# Patient Record
Sex: FEMALE | Employment: PART TIME | URBAN - METROPOLITAN AREA
[De-identification: names, ages, dates, MRNs, and addresses within clinical notes are randomized per-mention and may not be internally consistent; named-entity substitution may affect disease eponyms.]

---

## 2020-12-01 ENCOUNTER — OFFICE VISIT (OUTPATIENT)
Dept: FAMILY MEDICINE | Facility: CLINIC | Age: 25
End: 2020-12-01
Payer: COMMERCIAL

## 2020-12-01 VITALS
OXYGEN SATURATION: 98 % | HEART RATE: 84 BPM | SYSTOLIC BLOOD PRESSURE: 108 MMHG | WEIGHT: 125.6 LBS | HEIGHT: 62 IN | TEMPERATURE: 97.8 F | DIASTOLIC BLOOD PRESSURE: 74 MMHG | BODY MASS INDEX: 23.11 KG/M2 | RESPIRATION RATE: 16 BRPM

## 2020-12-01 DIAGNOSIS — R39.15 URINARY URGENCY: ICD-10-CM

## 2020-12-01 DIAGNOSIS — M62.81 GENERALIZED MUSCLE WEAKNESS: Primary | ICD-10-CM

## 2020-12-01 DIAGNOSIS — F45.8 FUNCTIONAL DYSPNEA: ICD-10-CM

## 2020-12-01 DIAGNOSIS — F41.8 MIXED ANXIETY AND DEPRESSIVE DISORDER: ICD-10-CM

## 2020-12-01 DIAGNOSIS — Z13.1 DIABETES MELLITUS SCREENING: ICD-10-CM

## 2020-12-01 LAB
ALBUMIN SERPL-MCNC: 4 G/DL (ref 3.4–5)
ALBUMIN UR-MCNC: NEGATIVE MG/DL
ALP SERPL-CCNC: 42 U/L (ref 40–150)
ALT SERPL W P-5'-P-CCNC: 21 U/L (ref 0–50)
ANION GAP SERPL CALCULATED.3IONS-SCNC: 2 MMOL/L (ref 3–14)
APPEARANCE UR: CLEAR
AST SERPL W P-5'-P-CCNC: 17 U/L (ref 0–45)
BILIRUB SERPL-MCNC: 0.2 MG/DL (ref 0.2–1.3)
BILIRUB UR QL STRIP: NEGATIVE
BUN SERPL-MCNC: 10 MG/DL (ref 7–30)
CALCIUM SERPL-MCNC: 9.2 MG/DL (ref 8.5–10.1)
CHLORIDE SERPL-SCNC: 110 MMOL/L (ref 94–109)
CO2 SERPL-SCNC: 30 MMOL/L (ref 20–32)
COLOR UR AUTO: YELLOW
CREAT SERPL-MCNC: 0.54 MG/DL (ref 0.52–1.04)
ERYTHROCYTE [DISTWIDTH] IN BLOOD BY AUTOMATED COUNT: 11.8 % (ref 10–15)
GFR SERPL CREATININE-BSD FRML MDRD: >90 ML/MIN/{1.73_M2}
GLUCOSE SERPL-MCNC: 91 MG/DL (ref 70–99)
GLUCOSE UR STRIP-MCNC: NEGATIVE MG/DL
HBA1C MFR BLD: 5.1 % (ref 0–5.6)
HCT VFR BLD AUTO: 41 % (ref 35–47)
HGB BLD-MCNC: 14 G/DL (ref 11.7–15.7)
HGB UR QL STRIP: NEGATIVE
KETONES UR STRIP-MCNC: NEGATIVE MG/DL
LEUKOCYTE ESTERASE UR QL STRIP: NEGATIVE
MCH RBC QN AUTO: 33.7 PG (ref 26.5–33)
MCHC RBC AUTO-ENTMCNC: 34.1 G/DL (ref 31.5–36.5)
MCV RBC AUTO: 99 FL (ref 78–100)
NITRATE UR QL: NEGATIVE
PH UR STRIP: 5.5 PH (ref 5–7)
PLATELET # BLD AUTO: 298 10E9/L (ref 150–450)
POTASSIUM SERPL-SCNC: 3.6 MMOL/L (ref 3.4–5.3)
PROT SERPL-MCNC: 7.5 G/DL (ref 6.8–8.8)
RBC # BLD AUTO: 4.16 10E12/L (ref 3.8–5.2)
SODIUM SERPL-SCNC: 142 MMOL/L (ref 133–144)
SOURCE: NORMAL
SP GR UR STRIP: 1.02 (ref 1–1.03)
TSH SERPL DL<=0.005 MIU/L-ACNC: 1.16 MU/L (ref 0.4–4)
UROBILINOGEN UR STRIP-ACNC: 0.2 EU/DL (ref 0.2–1)
VIT B12 SERPL-MCNC: 239 PG/ML (ref 193–986)
WBC # BLD AUTO: 5.8 10E9/L (ref 4–11)

## 2020-12-01 PROCEDURE — 36415 COLL VENOUS BLD VENIPUNCTURE: CPT | Performed by: FAMILY MEDICINE

## 2020-12-01 PROCEDURE — 81003 URINALYSIS AUTO W/O SCOPE: CPT | Performed by: FAMILY MEDICINE

## 2020-12-01 PROCEDURE — 99204 OFFICE O/P NEW MOD 45 MIN: CPT | Performed by: FAMILY MEDICINE

## 2020-12-01 PROCEDURE — 84443 ASSAY THYROID STIM HORMONE: CPT | Performed by: FAMILY MEDICINE

## 2020-12-01 PROCEDURE — 80053 COMPREHEN METABOLIC PANEL: CPT | Performed by: FAMILY MEDICINE

## 2020-12-01 PROCEDURE — 82607 VITAMIN B-12: CPT | Performed by: FAMILY MEDICINE

## 2020-12-01 PROCEDURE — 82306 VITAMIN D 25 HYDROXY: CPT | Performed by: FAMILY MEDICINE

## 2020-12-01 PROCEDURE — 85027 COMPLETE CBC AUTOMATED: CPT | Performed by: FAMILY MEDICINE

## 2020-12-01 PROCEDURE — 83036 HEMOGLOBIN GLYCOSYLATED A1C: CPT | Performed by: FAMILY MEDICINE

## 2020-12-01 SDOH — HEALTH STABILITY: MENTAL HEALTH: HOW OFTEN DO YOU HAVE SIX OR MORE DRINKS ON ONE OCCASION?: NEVER

## 2020-12-01 SDOH — HEALTH STABILITY: MENTAL HEALTH: HOW MANY STANDARD DRINKS CONTAINING ALCOHOL DO YOU HAVE ON A TYPICAL DAY?: NOT ASKED

## 2020-12-01 SDOH — HEALTH STABILITY: MENTAL HEALTH: HOW OFTEN DO YOU HAVE A DRINK CONTAINING ALCOHOL?: NEVER

## 2020-12-01 SDOH — HEALTH STABILITY: MENTAL HEALTH: HOW MANY DRINKS CONTAINING ALCOHOL DO YOU HAVE ON A TYPICAL DAY WHEN YOU ARE DRINKING?: NOT ASKED

## 2020-12-01 SDOH — HEALTH STABILITY: MENTAL HEALTH: HOW OFTEN DO YOU HAVE 6 OR MORE DRINKS ON ONE OCCASION?: NEVER

## 2020-12-01 ASSESSMENT — PATIENT HEALTH QUESTIONNAIRE - PHQ9
5. POOR APPETITE OR OVEREATING: MORE THAN HALF THE DAYS
SUM OF ALL RESPONSES TO PHQ QUESTIONS 1-9: 11

## 2020-12-01 ASSESSMENT — MIFFLIN-ST. JEOR: SCORE: 1257.97

## 2020-12-01 ASSESSMENT — ANXIETY QUESTIONNAIRES
3. WORRYING TOO MUCH ABOUT DIFFERENT THINGS: NEARLY EVERY DAY
2. NOT BEING ABLE TO STOP OR CONTROL WORRYING: NEARLY EVERY DAY
5. BEING SO RESTLESS THAT IT IS HARD TO SIT STILL: SEVERAL DAYS
1. FEELING NERVOUS, ANXIOUS, OR ON EDGE: SEVERAL DAYS
GAD7 TOTAL SCORE: 13
7. FEELING AFRAID AS IF SOMETHING AWFUL MIGHT HAPPEN: NOT AT ALL
IF YOU CHECKED OFF ANY PROBLEMS ON THIS QUESTIONNAIRE, HOW DIFFICULT HAVE THESE PROBLEMS MADE IT FOR YOU TO DO YOUR WORK, TAKE CARE OF THINGS AT HOME, OR GET ALONG WITH OTHER PEOPLE: SOMEWHAT DIFFICULT
6. BECOMING EASILY ANNOYED OR IRRITABLE: NEARLY EVERY DAY

## 2020-12-01 NOTE — PROGRESS NOTES
Subjective     Tierra Flowers is a 25 year old female who presents to clinic today for the following health issues:    HPI            New Patient to [facility].    New Patient/Transfer of Care. Moved to [place] from [place] in 2018- has not been seen in health care since move.   Previous PCP; [facility] in [place]- RIMMA signed.     Multiple concerns.     Generalized weakness x 3-4 years.   Reports feeling weakness in her bones or joints all over body.  Unable to hold objects for more than a few minutes.   Will struggle at times getting up from a sitting position.  Hears cracking in knees with walking. Denies having knee pain.     Reporting overall dryness of skin, wanting to be screened for diabetes.   Maternal uncle has diabetes.     Reports having urinary urgency. No dysuria, blood in urine or vaginal symptoms    Intermittent dyspnea x 9 years   Reports occasional sighing with dyspnea chest tightness/ heaviness with stressful situations- feels as it she can not take a full breath and will also notice if she has too much caffeine.  Will have a pain in middle of chest when she is struggling with her breathing.  No  Previous dx of asthma or anxiety.   States that previous work up was unremarkable.     States that she's had some mixed symptoms of depression with anxiety since the COVID-19 pandemic and having to work from home.    Last PHQ-9 12/1/2020   1.  Little interest or pleasure in doing things 2   2.  Feeling down, depressed, or hopeless 1   3.  Trouble falling or staying asleep, or sleeping too much 1   4.  Feeling tired or having little energy 2   5.  Poor appetite or overeating 1   6.  Feeling bad about yourself 1   7.  Trouble concentrating 2   8.  Moving slowly or restless 1   Q9: Thoughts of better off dead/self-harm past 2 weeks 0   PHQ-9 Total Score 11     FRANCINE-7  12/1/2020   1. Feeling nervous, anxious, or on edge 1   2. Not being able to stop or control worrying 3   3. Worrying too much about different things  "3   4. Trouble relaxing 2   5. Being so restless that it is hard to sit still 1   6. Becoming easily annoyed or irritable 3   7. Feeling afraid, as if something awful might happen 0   FRANCINE-7 Total Score 13   If you checked any problems, how difficult have they made it for you to do your work, take care of things at home, or get along with other people? Somewhat difficult     In the past two weeks have you had thoughts of suicide or self-harm?  No.    Do you have concerns about your personal safety or the safety of others?   No            HEALTH CARE MAINTENANCE: Due for a Physical with a Pap and screening labs.    Has never had pap smear completed before, choses not to have done until after she has been sexually active due to religous reasons.         Review of Systems   Constitutional, HEENT, cardiovascular, pulmonary, GI, , musculoskeletal, neuro, skin, endocrine and psych systems are negative, except as otherwise noted.        Objective    /74   Pulse 84   Temp 97.8  F (36.6  C) (Tympanic)   Resp 16   Ht 1.575 m (5' 2\")   Wt 57 kg (125 lb 9.6 oz)   LMP 11/19/2020   SpO2 98%   Breastfeeding No   BMI 22.97 kg/m    Body mass index is 22.97 kg/m .  Physical Exam   GENERAL: healthy, alert and no distress  EYES: Eyes grossly normal to inspection, PERRL and conjunctivae and sclerae normal  HENT: ear canals and TM's normal, nose and mouth without ulcers or lesions  NECK: no adenopathy, no asymmetry, masses, or scars and thyroid normal to palpation  RESP: lungs clear to auscultation - no rales, rhonchi or wheezes  CV: regular rate and rhythm, normal S1 S2, no S3 or S4, no murmur, click or rub, no peripheral edema and peripheral pulses strong  PSYCH: mentation appears normal, anxious, judgement and insight intact and appearance well groomed    DATA  Reviewed and discussed with patient prior to discharge.  Results for orders placed or performed in visit on 12/01/20   Hemoglobin A1c     Status: None   Result " Value Ref Range    Hemoglobin A1C 5.1 0 - 5.6 %   UA reflex to Microscopic and Culture     Status: None    Specimen: Midstream Urine   Result Value Ref Range    Color Urine Yellow     Appearance Urine Clear     Glucose Urine Negative NEG^Negative mg/dL    Bilirubin Urine Negative NEG^Negative    Ketones Urine Negative NEG^Negative mg/dL    Specific Gravity Urine 1.020 1.003 - 1.035    Blood Urine Negative NEG^Negative    pH Urine 5.5 5.0 - 7.0 pH    Protein Albumin Urine Negative NEG^Negative mg/dL    Urobilinogen Urine 0.2 0.2 - 1.0 EU/dL    Nitrite Urine Negative NEG^Negative    Leukocyte Esterase Urine Negative NEG^Negative    Source Midstream Urine    CBC with platelets     Status: Abnormal   Result Value Ref Range    WBC 5.8 4.0 - 11.0 10e9/L    RBC Count 4.16 3.8 - 5.2 10e12/L    Hemoglobin 14.0 11.7 - 15.7 g/dL    Hematocrit 41.0 35.0 - 47.0 %    MCV 99 78 - 100 fl    MCH 33.7 (H) 26.5 - 33.0 pg    MCHC 34.1 31.5 - 36.5 g/dL    RDW 11.8 10.0 - 15.0 %    Platelet Count 298 150 - 450 10e9/L             Assessment & Plan     Tierra was seen today for fatigue, breathing problem and uti.    Diagnoses and all orders for this visit:    Generalized muscle weakness  -     CBC with platelets  -     Comprehensive metabolic panel  -     TSH with free T4 reflex  -     Vitamin B12  -     Vitamin D Deficiency  -     PHQ-9/FRANCINE 7 completed, see above/Epic for details      Diabetes mellitus screening  -     Hemoglobin A1c    Urinary urgency  -     UA reflex to Microscopic and Culture    Mixed anxiety and depressive disorder       -     PHQ-9/FRANCINE 7 completed, see above/Epic for details        -     Await labs above, if normal consider starting an selective serotonin reuptake inhibitor and referral for Psychotherapy.    Functional dyspnea, suspect related to anxiety       -    Will await previous records regarding workup done  and current labs.       -     Discuss anxiety treatment options at the next office visit, if labs are  normal.           Depression Screening Follow Up    PHQ 12/1/2020   PHQ-9 Total Score 11   Q9: Thoughts of better off dead/self-harm past 2 weeks Not at all     Last PHQ-9 12/1/2020   1.  Little interest or pleasure in doing things 2   2.  Feeling down, depressed, or hopeless 1   3.  Trouble falling or staying asleep, or sleeping too much 1   4.  Feeling tired or having little energy 2   5.  Poor appetite or overeating 1   6.  Feeling bad about yourself 1   7.  Trouble concentrating 2   8.  Moving slowly or restless 1   Q9: Thoughts of better off dead/self-harm past 2 weeks 0   PHQ-9 Total Score 11       Follow Up Actions Taken  Follow up recommended: 1 month         Return in about 1 month (around 1/1/2021), or if symptoms worsen or fail to improve, for a Physical Exam at your earliest convenience..    Marisa Garcia MD  [facility]

## 2020-12-01 NOTE — PATIENT INSTRUCTIONS
Will notify you with results and next steps.      Patient Education     Anxiety Reaction  Anxiety is the feeling we all get when we think something bad might happen. It is a normal response to stress and usually causes only a mild reaction. When anxiety becomes more severe, it can interfere with daily life. In some cases, you may not even be aware of what it is you re anxious about. There may also be a genetic link or it may be a learned behavior in the home.  Both psychological and physical triggers cause stress reaction. It's often a response to fear or emotional stress, real or imagined. This stress may come from home, family, work, or social relationships.  During an anxiety reaction, you may feel:    Helpless    Nervous    Depressed    Irritable  Your body may show signs of anxiety in many ways. You may experience:    Dry mouth    Shakiness    Dizziness    Weakness    Trouble breathing    Breathing fast (hyperventilating)    Chest pressure    Sweating    Headache    Nausea    Diarrhea    Tiredness    Inability to sleep    Sexual problems  Home care    Try to locate the sources of stress in your life. They may not be obvious. These may include:  ? Daily hassles of life (such as traffic jams, missed appointments, or car troubles)  ? Major life changes, both good (new baby or job promotion) and bad (loss of job or loss of loved one)  ? Overload: feeling that you have too many responsibilities and can't take care of all of them at once  ? Feeling helpless or feeling that your problems are beyond what you re able to solve    Notice how your body reacts to stress. Learn to listen to your body signals. This will help you take action before the stress becomes severe.    When you can, do something about the source of your stress. (Avoid hassles, limit the amount of change that happens in your life at one time and take a break when you feel overloaded).    Unfortunately, many stressful situations can't be avoided. It is  necessary to learn how to better manage stress. There are many proven methods that will reduce your anxiety. These include simple things like exercise, good nutrition, and adequate rest. Also, there are certain techniques that are helpful:  ? Relaxation  ? Breathing exercises  ? Visualization  ? Biofeedback  ? Meditation  For more information about this, consult your healthcare provider or go to a local bookstore and review the many books and tapes available on this subject.  Follow-up care  If you feel that your anxiety is not responding to self-help measures, contact your healthcare provider or make an appointment with a counselor. You may need short-term psychological counseling and temporary medicine to help you manage stress.  Call 911  Call 911 if any of these happen:    Trouble breathing    Confusion    Drowsiness or trouble wakening    Fainting or loss of consciousness    Rapid heart rate    Seizure    New chest pain that becomes more severe, lasts longer, or spreads into your shoulder, arm, neck, jaw, or back  When to seek medical advice  Call your healthcare provider right away if any of these happen:    Your symptoms get worse    Severe headache not relieved by rest and mild pain reliever  Deja last reviewed this educational content on 10/1/2017    8866-8653 [employer]. [address] [place] [address], [place] [address]. All rights reserved. This information is not intended as a substitute for professional medical care. Always follow your healthcare professional's instructions.

## 2020-12-02 LAB — DEPRECATED CALCIDIOL+CALCIFEROL SERPL-MC: 11 UG/L (ref 20–75)

## 2020-12-02 ASSESSMENT — ANXIETY QUESTIONNAIRES: GAD7 TOTAL SCORE: 13

## 2020-12-07 DIAGNOSIS — E55.9 VITAMIN D DEFICIENCY: Primary | ICD-10-CM

## 2020-12-07 RX ORDER — ERGOCALCIFEROL 1.25 MG/1
50000 CAPSULE, LIQUID FILLED ORAL WEEKLY
Qty: 8 CAPSULE | Refills: 0 | Status: SHIPPED | OUTPATIENT
Start: 2020-12-07 | End: 2023-04-18

## 2021-01-09 ENCOUNTER — HEALTH MAINTENANCE LETTER (OUTPATIENT)
Age: 26
End: 2021-01-09

## 2021-10-11 ENCOUNTER — HEALTH MAINTENANCE LETTER (OUTPATIENT)
Age: 26
End: 2021-10-11

## 2022-01-30 ENCOUNTER — HEALTH MAINTENANCE LETTER (OUTPATIENT)
Age: 27
End: 2022-01-30

## 2022-09-24 ENCOUNTER — HEALTH MAINTENANCE LETTER (OUTPATIENT)
Age: 27
End: 2022-09-24

## 2023-04-12 ENCOUNTER — TRANSFERRED RECORDS (OUTPATIENT)
Dept: MULTI SPECIALTY CLINIC | Facility: CLINIC | Age: 28
End: 2023-04-12

## 2023-04-12 LAB — HPV ABSTRACT: NORMAL

## 2023-04-18 ENCOUNTER — OFFICE VISIT (OUTPATIENT)
Dept: FAMILY MEDICINE | Facility: CLINIC | Age: 28
End: 2023-04-18
Payer: COMMERCIAL

## 2023-04-18 VITALS
DIASTOLIC BLOOD PRESSURE: 64 MMHG | OXYGEN SATURATION: 98 % | SYSTOLIC BLOOD PRESSURE: 100 MMHG | HEART RATE: 86 BPM | HEIGHT: 62 IN | BODY MASS INDEX: 23.52 KG/M2 | WEIGHT: 127.8 LBS | TEMPERATURE: 97.4 F | RESPIRATION RATE: 14 BRPM

## 2023-04-18 DIAGNOSIS — Z00.00 ROUTINE GENERAL MEDICAL EXAMINATION AT A HEALTH CARE FACILITY: Primary | ICD-10-CM

## 2023-04-18 DIAGNOSIS — Z11.59 NEED FOR HEPATITIS C SCREENING TEST: ICD-10-CM

## 2023-04-18 DIAGNOSIS — Z78.9 ATTEMPTING TO CONCEIVE: ICD-10-CM

## 2023-04-18 DIAGNOSIS — Z11.4 SCREENING FOR HIV (HUMAN IMMUNODEFICIENCY VIRUS): ICD-10-CM

## 2023-04-18 DIAGNOSIS — Z13.220 LIPID SCREENING: ICD-10-CM

## 2023-04-18 DIAGNOSIS — E03.9 ACQUIRED HYPOTHYROIDISM: ICD-10-CM

## 2023-04-18 LAB
ALBUMIN SERPL-MCNC: 3.8 G/DL (ref 3.4–5)
ALP SERPL-CCNC: 34 U/L (ref 40–150)
ALT SERPL W P-5'-P-CCNC: 18 U/L (ref 0–50)
ANION GAP SERPL CALCULATED.3IONS-SCNC: 2 MMOL/L (ref 3–14)
AST SERPL W P-5'-P-CCNC: 18 U/L (ref 0–45)
BILIRUB SERPL-MCNC: 0.3 MG/DL (ref 0.2–1.3)
BUN SERPL-MCNC: 10 MG/DL (ref 7–30)
CALCIUM SERPL-MCNC: 8.8 MG/DL (ref 8.5–10.1)
CHLORIDE BLD-SCNC: 108 MMOL/L (ref 94–109)
CHOLEST SERPL-MCNC: 126 MG/DL
CO2 SERPL-SCNC: 28 MMOL/L (ref 20–32)
CREAT SERPL-MCNC: 0.67 MG/DL (ref 0.52–1.04)
ERYTHROCYTE [DISTWIDTH] IN BLOOD BY AUTOMATED COUNT: 12 % (ref 10–15)
FASTING STATUS PATIENT QL REPORTED: YES
GFR SERPL CREATININE-BSD FRML MDRD: >90 ML/MIN/1.73M2
GLUCOSE BLD-MCNC: 78 MG/DL (ref 70–99)
HCT VFR BLD AUTO: 36.3 % (ref 35–47)
HCV AB SERPL QL IA: NONREACTIVE
HDLC SERPL-MCNC: 45 MG/DL
HGB BLD-MCNC: 12.6 G/DL (ref 11.7–15.7)
HIV 1+2 AB+HIV1 P24 AG SERPL QL IA: NONREACTIVE
LDLC SERPL CALC-MCNC: 70 MG/DL
MCH RBC QN AUTO: 34.7 PG (ref 26.5–33)
MCHC RBC AUTO-ENTMCNC: 34.7 G/DL (ref 31.5–36.5)
MCV RBC AUTO: 100 FL (ref 78–100)
NONHDLC SERPL-MCNC: 81 MG/DL
PLATELET # BLD AUTO: 290 10E3/UL (ref 150–450)
POTASSIUM BLD-SCNC: 3.6 MMOL/L (ref 3.4–5.3)
PROT SERPL-MCNC: 7.7 G/DL (ref 6.8–8.8)
RBC # BLD AUTO: 3.63 10E6/UL (ref 3.8–5.2)
SODIUM SERPL-SCNC: 138 MMOL/L (ref 133–144)
T4 FREE SERPL-MCNC: 1.06 NG/DL (ref 0.76–1.46)
TRIGL SERPL-MCNC: 56 MG/DL
TSH SERPL DL<=0.005 MIU/L-ACNC: 18.96 MU/L (ref 0.4–4)
WBC # BLD AUTO: 4.5 10E3/UL (ref 4–11)

## 2023-04-18 PROCEDURE — 86803 HEPATITIS C AB TEST: CPT | Performed by: FAMILY MEDICINE

## 2023-04-18 PROCEDURE — 86376 MICROSOMAL ANTIBODY EACH: CPT | Performed by: FAMILY MEDICINE

## 2023-04-18 PROCEDURE — 99214 OFFICE O/P EST MOD 30 MIN: CPT | Mod: 25 | Performed by: FAMILY MEDICINE

## 2023-04-18 PROCEDURE — 84443 ASSAY THYROID STIM HORMONE: CPT | Performed by: FAMILY MEDICINE

## 2023-04-18 PROCEDURE — 87389 HIV-1 AG W/HIV-1&-2 AB AG IA: CPT | Performed by: FAMILY MEDICINE

## 2023-04-18 PROCEDURE — 84439 ASSAY OF FREE THYROXINE: CPT | Performed by: FAMILY MEDICINE

## 2023-04-18 PROCEDURE — 80061 LIPID PANEL: CPT | Performed by: FAMILY MEDICINE

## 2023-04-18 PROCEDURE — 36415 COLL VENOUS BLD VENIPUNCTURE: CPT | Performed by: FAMILY MEDICINE

## 2023-04-18 PROCEDURE — 85027 COMPLETE CBC AUTOMATED: CPT | Performed by: FAMILY MEDICINE

## 2023-04-18 PROCEDURE — 99395 PREV VISIT EST AGE 18-39: CPT | Performed by: FAMILY MEDICINE

## 2023-04-18 PROCEDURE — 80053 COMPREHEN METABOLIC PANEL: CPT | Performed by: FAMILY MEDICINE

## 2023-04-18 RX ORDER — PRENATAL VIT/IRON FUM/FOLIC AC 27MG-0.8MG
1 TABLET ORAL DAILY
Qty: 90 TABLET | Refills: 3 | COMMUNITY
Start: 2023-04-18 | End: 2023-04-25

## 2023-04-18 RX ORDER — LEVOTHYROXINE SODIUM 100 UG/1
1 TABLET ORAL DAILY
COMMUNITY
Start: 2023-04-13 | End: 2023-05-23

## 2023-04-18 ASSESSMENT — ANXIETY QUESTIONNAIRES
IF YOU CHECKED OFF ANY PROBLEMS ON THIS QUESTIONNAIRE, HOW DIFFICULT HAVE THESE PROBLEMS MADE IT FOR YOU TO DO YOUR WORK, TAKE CARE OF THINGS AT HOME, OR GET ALONG WITH OTHER PEOPLE: SOMEWHAT DIFFICULT
GAD7 TOTAL SCORE: 4
2. NOT BEING ABLE TO STOP OR CONTROL WORRYING: SEVERAL DAYS
7. FEELING AFRAID AS IF SOMETHING AWFUL MIGHT HAPPEN: NOT AT ALL
1. FEELING NERVOUS, ANXIOUS, OR ON EDGE: SEVERAL DAYS
3. WORRYING TOO MUCH ABOUT DIFFERENT THINGS: NOT AT ALL
GAD7 TOTAL SCORE: 4
6. BECOMING EASILY ANNOYED OR IRRITABLE: SEVERAL DAYS
5. BEING SO RESTLESS THAT IT IS HARD TO SIT STILL: NOT AT ALL

## 2023-04-18 ASSESSMENT — ENCOUNTER SYMPTOMS
PALPITATIONS: 0
NAUSEA: 0
PARESTHESIAS: 0
HEMATURIA: 0
FREQUENCY: 0
WEAKNESS: 1
FEVER: 0
ABDOMINAL PAIN: 0
MYALGIAS: 1
EYE PAIN: 0
SORE THROAT: 0
SHORTNESS OF BREATH: 0
ARTHRALGIAS: 0
HEARTBURN: 0
NERVOUS/ANXIOUS: 0
HEMATOCHEZIA: 0
COUGH: 0
CHILLS: 0
CONSTIPATION: 0
JOINT SWELLING: 0
DIZZINESS: 0
DYSURIA: 0
HEADACHES: 1
BREAST MASS: 0
DIARRHEA: 0

## 2023-04-18 ASSESSMENT — PAIN SCALES - GENERAL: PAINLEVEL: NO PAIN (0)

## 2023-04-18 ASSESSMENT — PATIENT HEALTH QUESTIONNAIRE - PHQ9
5. POOR APPETITE OR OVEREATING: SEVERAL DAYS
SUM OF ALL RESPONSES TO PHQ QUESTIONS 1-9: 6

## 2023-04-18 NOTE — PROGRESS NOTES
SUBJECTIVE:   CC: Tierra is an 28 year old who presents for preventive health visit.       4/18/2023     8:25 AM   Additional Questions   Roomed by sal ruiz   Accompanied by self         4/18/2023     8:25 AM   Patient Reported Additional Medications   Patient reports taking the following new medications levothyroxine- dose is unknown   Patient has been advised of split billing requirements and indicates understanding: Yes  Healthy Habits:     Getting at least 3 servings of Calcium per day:  Yes    Bi-annual eye exam:  NO    Dental care twice a year:  Yes    Sleep apnea or symptoms of sleep apnea:  None    Diet:  Regular (no restrictions) and Other    Frequency of exercise:  1 day/week    Duration of exercise:  15-30 minutes    Taking medications regularly:  Yes    Medication side effects:  Not applicable    PHQ-2 Total Score: 0    Additional concerns today:  No    Last seen here in 2020  Was seen at [facility] earlier this month.    Attempting to conceive-has been trying since 9/2022  Pap smear was completed but breast exam was not.   Pap test reported normal.     Recently diagnosed with Hypothyroidism.   Started on Levothyroxine less than a week ago.   Patient reporting multiple symptoms of fatigue, poor concentration. Generalized weakness all throughout her body with loss of interest in doing things.  Feels that her symptoms started after receiving her COVID-vaccine in April 2021.  No prior known history of depression or anxiety.   PHQ-9/FRANCINE 7 completed, see below/Epic for details      HEALTH CARE MAINTENANCE:  Believes she has had tdap vaccine in the last 10 years.  Due for screening labs.    Today's PHQ-2 Score:       4/18/2023     8:18 AM   PHQ-2 ( 1999 [employer])   Q1: Little interest or pleasure in doing things 0   Q2: Feeling down, depressed or hopeless 0   PHQ-2 Score 0   Q1: Little interest or pleasure in doing things More than half the days    Not at all   Q2: Feeling down, depressed or  hopeless Not at all    Not at all   PHQ-2 Score 2    0       Have you ever done Advance Care Planning? (For example, a Health Directive, POLST, or a discussion with a medical provider or your loved ones about your wishes): No, advance care planning information given to patient to review.  Patient plans to discuss their wishes with loved ones or provider.      Social History     Tobacco Use     Smoking status: Never     Smokeless tobacco: Never   Vaping Use     Vaping status: Never Used   Substance Use Topics     Alcohol use: Never             2023     8:18 AM   Alcohol Use   Prescreen: >3 drinks/day or >7 drinks/week? No          View : No data to display.              Reviewed orders with patient.  Reviewed health maintenance and updated orders accordingly - Yes  Lab work is in process  Labs reviewed in EPIC  BP Readings from Last 3 Encounters:   23 100/64   20 108/74    Wt Readings from Last 3 Encounters:   23 58 kg (127 lb 12.8 oz)   20 57 kg (125 lb 9.6 oz)                  There is no problem list on file for this patient.    Past Surgical History:   Procedure Laterality Date     NO HISTORY OF SURGERY         Social History     Tobacco Use     Smoking status: Never     Smokeless tobacco: Never   Vaping Use     Vaping status: Never Used   Substance Use Topics     Alcohol use: Never     Family History   Problem Relation Age of Onset     Hyperlipidemia Mother      Diabetes Maternal Uncle      Breast Cancer No family hx of      Colon Cancer No family hx of          Current Outpatient Medications   Medication Sig Dispense Refill     levothyroxine (SYNTHROID/LEVOTHROID) 100 MCG tablet Take 1 tablet by mouth daily       Prenatal Vit-Fe Fumarate-FA (PRENATAL MULTIVITAMIN W/IRON) 27-0.8 MG tablet Take 1 tablet by mouth daily 90 tablet 3     No Known Allergies    Breast Cancer Screenin/18/2023     8:18 AM   Breast CA Risk Assessment (FHS-7)   Do you have a family history of breast,  "colon, or ovarian cancer? No / Unknown           History of abnormal Pap smear: NO - age 30-65 PAP every 5 years with negative HPV co-testing recommended     Reviewed and updated as needed this visit by clinical staff   Tobacco  Allergies  Meds  Problems  Med Hx  Surg Hx  Fam Hx          Reviewed and updated as needed this visit by Provider   Tobacco    Problems  Med Hx  Surg Hx  Fam Hx             Review of Systems   Constitutional: Negative for chills and fever.   HENT: Negative for congestion, ear pain, hearing loss and sore throat.    Eyes: Negative for pain and visual disturbance.   Respiratory: Negative for cough and shortness of breath.    Cardiovascular: Negative for chest pain, palpitations and peripheral edema.   Gastrointestinal: Negative for abdominal pain, constipation, diarrhea, heartburn, hematochezia and nausea.   Breasts:  Negative for tenderness, breast mass and discharge.   Genitourinary: Negative for dysuria, frequency, genital sores, hematuria, pelvic pain, urgency, vaginal bleeding and vaginal discharge.   Musculoskeletal: Positive for myalgias. Negative for arthralgias and joint swelling.   Skin: Negative for rash.   Neurological: Positive for weakness and headaches. Negative for dizziness and paresthesias.   Psychiatric/Behavioral: Negative for mood changes. The patient is not nervous/anxious.           OBJECTIVE:   /64   Pulse 86   Temp 97.4  F (36.3  C) (Tympanic)   Resp 14   Ht 1.58 m (5' 2.21\")   Wt 58 kg (127 lb 12.8 oz)   LMP 04/15/2023 (Exact Date)   SpO2 98%   BMI 23.22 kg/m    Physical Exam  GENERAL: healthy, alert and no distress  EYES: Eyes grossly normal to inspection, PERRL and conjunctivae and sclerae normal  HENT: ear canals and TM's normal, nose and mouth without ulcers or lesions  NECK: no adenopathy, no asymmetry, masses, or scars and thyroid normal to palpation  RESP: lungs clear to auscultation - no rales, rhonchi or wheezes  BREAST: normal " without masses, tenderness or nipple discharge and no palpable axillary masses or adenopathy  CV: regular rate and rhythm, normal S1 S2, no S3 or S4, no murmur, click or rub, no peripheral edema and peripheral pulses strong  ABDOMEN: soft, nontender, no hepatosplenomegaly, no masses and bowel sounds normal  MS: no gross musculoskeletal defects noted, no edema  SKIN: no suspicious lesions or rashes  NEURO: Normal strength and tone, mentation intact and speech normal  PSYCH: mentation appears normal, affect normal/bright    Diagnostic Test Results:  Labs drawn and in process    ASSESSMENT/PLAN:   Tierra was seen today for physical.    Diagnoses and all orders for this visit:    Routine general medical examination at a health care facility  -     REVIEW OF HEALTH MAINTENANCE PROTOCOL ORDERS  -     CBC with platelets  -     Comprehensive metabolic panel (BMP + Alb, Alk Phos, ALT, AST, Total. Bili, TP)    Lipid screening  -     Lipid panel reflex to direct LDL Fasting    Screening for HIV (human immunodeficiency virus)  -     HIV Antigen Antibody Combo    Need for hepatitis C screening test  -     Hepatitis C Screen Reflex to HCV RNA Quant and Genotype    Acquired hypothyroidism, newly diagnosed with multiple symptoms  -     TSH with free T4 reflex; Future  -     Thyroid peroxidase antibody; Future  Patient education and Handout with home care instructions given. See AVS for details.  - Reevaluate in 4 to 6 weeks after starting levothyroxine.      Attempting to conceive  -     Prenatal Vit-Fe Fumarate-FA (PRENATAL MULTIVITAMIN W/IRON) 27-0.8 MG tablet; Take 1 tablet by mouth daily        Patient has been advised of split billing requirements and indicates understanding: Yes      COUNSELING:  Reviewed preventive health counseling, as reflected in patient instructions       Regular exercise       Healthy diet/nutrition        She reports that she has never smoked. She has never used smokeless tobacco.    Return in about 1  month (around 5/18/2023) for Follow up. Sooner if needed      Marisa Garcia MD  [facility]

## 2023-04-19 LAB — THYROPEROXIDASE AB SERPL-ACNC: 143 IU/ML

## 2023-04-21 ENCOUNTER — TELEPHONE (OUTPATIENT)
Dept: FAMILY MEDICINE | Facility: CLINIC | Age: 28
End: 2023-04-21
Payer: COMMERCIAL

## 2023-04-21 DIAGNOSIS — E06.3 HASHIMOTO'S THYROIDITIS: Primary | ICD-10-CM

## 2023-04-21 DIAGNOSIS — Z78.9 ATTEMPTING TO CONCEIVE: ICD-10-CM

## 2023-04-21 NOTE — TELEPHONE ENCOUNTER
----- Message from Marisa Garcia MD sent at 4/21/2023  2:28 PM CDT -----  Robdu-    Your recent labs showed-     HIV and hepatitis C screen were negative.  Complete blood count was normal.  Complete Metabolic Panel (panel that checks liver function, kidney function and electrolytes) was normal.   Thyroid labs revealed an underactive thyroid with positive thyroid peroxidase antibodies-this indicates Hashimoto's thyroiditis which is an autoimmune disease and the most common cause of hypothyroidism (condition in which your thyroid does not make enough thyroid hormones).    Recommended to continue current dose of Levothyroxine and schedule a follow up visit in 4 to 6 weeks to re-evaluate thyroid function.    Please contact me if you have any additional questions or concerns.    Marisa Garcia MD

## 2023-04-21 NOTE — TELEPHONE ENCOUNTER
Called  [phone] (home), Did they answer the phone: No, left a message on voicemail to return call to the [facility] at [phone], and to ask for any available triage nurse.  Converted to a JAMEEL Pagan RN  Triage Nurse  [facility]: [facility]

## 2023-04-25 RX ORDER — PRENATAL VIT/IRON FUM/FOLIC AC 27MG-0.8MG
1 TABLET ORAL DAILY
Qty: 90 TABLET | Refills: 3 | Status: SHIPPED | OUTPATIENT
Start: 2023-04-25 | End: 2023-08-22

## 2023-04-25 NOTE — TELEPHONE ENCOUNTER
Called [phone] (home). Did they answer the phone: No, left a message on voicemail to return call to the [facility] at [phone], and to ask for any available triage nurse.    Latasha RN  Triage Nurse  [facility]: [facility]

## 2023-04-25 NOTE — TELEPHONE ENCOUNTER
Spoke with patient, relayed providers(Dr. Garcia) message(below) and patient verbalized understanding; patient [facility]    Patient requesting prenatal ordered be sent to [facility].    Rosa Price RN

## 2023-05-15 ENCOUNTER — TELEPHONE (OUTPATIENT)
Dept: FAMILY MEDICINE | Facility: CLINIC | Age: 28
End: 2023-05-15
Payer: COMMERCIAL

## 2023-05-15 NOTE — TELEPHONE ENCOUNTER
Patient call saying that pcp wants patient to be seen in four weeks. Although, no available appointments until June? Wondering if pcp could squeeze her in or doing a virtual is okay too. Please call patient back to confirm.

## 2023-05-22 ENCOUNTER — OFFICE VISIT (OUTPATIENT)
Dept: FAMILY MEDICINE | Facility: CLINIC | Age: 28
End: 2023-05-22
Payer: COMMERCIAL

## 2023-05-22 VITALS
BODY MASS INDEX: 23.26 KG/M2 | OXYGEN SATURATION: 100 % | HEIGHT: 62 IN | TEMPERATURE: 97.8 F | DIASTOLIC BLOOD PRESSURE: 62 MMHG | HEART RATE: 98 BPM | SYSTOLIC BLOOD PRESSURE: 98 MMHG | WEIGHT: 126.4 LBS | RESPIRATION RATE: 14 BRPM

## 2023-05-22 DIAGNOSIS — E55.9 VITAMIN D DEFICIENCY: ICD-10-CM

## 2023-05-22 DIAGNOSIS — L73.9 FOLLICULITIS OF PERINEUM: ICD-10-CM

## 2023-05-22 DIAGNOSIS — J30.9 ALLERGIC RHINITIS, UNSPECIFIED SEASONALITY, UNSPECIFIED TRIGGER: Primary | ICD-10-CM

## 2023-05-22 DIAGNOSIS — E06.3 HASHIMOTO'S THYROIDITIS: ICD-10-CM

## 2023-05-22 PROCEDURE — 86003 ALLG SPEC IGE CRUDE XTRC EA: CPT | Performed by: FAMILY MEDICINE

## 2023-05-22 PROCEDURE — 82785 ASSAY OF IGE: CPT | Performed by: FAMILY MEDICINE

## 2023-05-22 PROCEDURE — 82306 VITAMIN D 25 HYDROXY: CPT | Performed by: FAMILY MEDICINE

## 2023-05-22 PROCEDURE — 99214 OFFICE O/P EST MOD 30 MIN: CPT | Performed by: FAMILY MEDICINE

## 2023-05-22 PROCEDURE — 84443 ASSAY THYROID STIM HORMONE: CPT | Performed by: FAMILY MEDICINE

## 2023-05-22 PROCEDURE — 36415 COLL VENOUS BLD VENIPUNCTURE: CPT | Performed by: FAMILY MEDICINE

## 2023-05-22 RX ORDER — CETIRIZINE HYDROCHLORIDE 10 MG/1
10 TABLET ORAL DAILY
Qty: 90 TABLET | Refills: 0 | COMMUNITY
Start: 2023-05-22 | End: 2023-08-22

## 2023-05-22 RX ORDER — FLUTICASONE PROPIONATE 50 MCG
2 SPRAY, SUSPENSION (ML) NASAL DAILY
Qty: 16 G | Refills: 0 | Status: SHIPPED | OUTPATIENT
Start: 2023-05-22 | End: 2024-02-16

## 2023-05-22 ASSESSMENT — ENCOUNTER SYMPTOMS: WHEEZING: 1

## 2023-05-22 ASSESSMENT — PAIN SCALES - GENERAL: PAINLEVEL: EXTREME PAIN (8)

## 2023-05-22 NOTE — PROGRESS NOTES
Assessment & Plan     Allergic rhinitis, unspecified seasonality, unspecified trigger  -  Recommend a [place] Allergen panel, in the interim recommend Flonase nasal spray and an antihistamine.  -  [place] Resp Allergen Panel  -  cetirizine (ZYRTEC) 10 MG tablet  Dispense: 90 tablet; Refill: 0  -  fluticasone (FLONASE) 50 MCG/ACT nasal spray  Dispense: 16 g; Refill: 0    Hashimoto's thyroiditis  - TSH with free T4 reflex FUTURE 2mo  - On Levothyroxine- will renew/adjust medications after labs.     Folliculitis of perineum  - No antibiotics needed at this time.   Patient education and Handout with home care instructions given. See AVS for details.      Vitamin D deficiency  - Vitamin D Deficiency    Will notify her with results.       Return in about 2 weeks (around 6/5/2023), or if symptoms worsen or fail to improve.      Marisa Garcia MD  [facility] CONSUELO Mayorga is a 28 year old, presenting for the following health issues:  Allergies (/Possible return of asthma or allergies), Thyroid Disease (Recheck), and Hair/Scalp Problem (Ingrown hair inner R thigh)        4/18/2023     8:25 AM   Additional Questions   Roomed by sal ruiz   Accompanied by self     Allergies    Thyroid Disease    History of Present Illness       Reason for visit:  Thyroid    She eats 0-1 servings of fruits and vegetables daily.She consumes 0 sweetened beverage(s) daily.She exercises with enough effort to increase her heart rate 30 to 60 minutes per day.    She is taking medications regularly.         Hypothyroidism Follow-up  On Levothyroxine     Since last visit, patient describes the following symptoms: constipation, anxiety, depression, fatigue and hair loss.    Recent TSH-   TSH   Date Value Ref Range Status   04/18/2023 18.96 (H) 0.40 - 4.00 mU/L Final   12/01/2020 1.16 0.40 - 4.00 mU/L Final         Allergies/asthma  Onset/Duration: 3 weeks  Symptoms:   Nasal congestion: YES  Sneezing: YES  Red, itchy  "eyes: YES  Progression of Symptoms: worse  Accompanying Signs & Symptoms:  Cough: No  Wheezing: YES ? allergies  Rash: No  Sinus/facial pain: YES  History:   Is it seasonal:  Unsure   History of Asthma: unknown  Has allergy testing been done: No  Precipitating factors:   Using spices while cooking  Alleviating factors:  None  Therapies tried and outcome: None      Ingrown hair inner R thigh  No active drainage or fever.       History of Vitamin D deficiency-   Due for follow up labs.   Last Vitamin D was   Component      Latest Ref Rng 12/1/2020  1:28 PM   Vitamin D Deficiency screening      20 - 75 ug/L 11 (L)       HEALTH CARE MAINTENANCE: Last Pap test was on 4/12/2023      Review of Systems   Respiratory: Positive for wheezing.             Objective    BP 98/62   Pulse 98   Temp 97.8  F (36.6  C) (Temporal)   Resp 14   Ht 1.585 m (5' 2.4\")   Wt 57.3 kg (126 lb 6.4 oz)   LMP 05/14/2023 (Exact Date)   SpO2 100%   BMI 22.82 kg/m    Body mass index is 22.82 kg/m .  Physical Exam   GENERAL: healthy, alert and no distress  SKIN: # 1 sub centimeter folliculitis lesion in the perineum area. No signs of cellulitis.                 "

## 2023-05-23 DIAGNOSIS — E06.3 HASHIMOTO'S THYROIDITIS: Primary | ICD-10-CM

## 2023-05-23 LAB
DEPRECATED CALCIDIOL+CALCIFEROL SERPL-MC: 27 UG/L (ref 20–75)
TSH SERPL DL<=0.005 MIU/L-ACNC: 1.92 MU/L (ref 0.4–4)

## 2023-05-23 RX ORDER — LEVOTHYROXINE SODIUM 100 UG/1
100 TABLET ORAL DAILY
Qty: 90 TABLET | Refills: 3 | Status: SHIPPED | OUTPATIENT
Start: 2023-05-23 | End: 2023-08-22

## 2023-05-30 LAB
A ALTERNATA IGE QN: <0.1 KU(A)/L
A FUMIGATUS IGE QN: <0.1 KU(A)/L
BERMUDA GRASS IGE QN: <0.1 KU(A)/L
C HERBARUM IGE QN: <0.1 KU(A)/L
CAT DANDER IGG QN: <0.1 KU(A)/L
CEDAR IGE QN: <0.1 KU(A)/L
COMMON RAGWEED IGE QN: <0.1 KU(A)/L
COTTONWOOD IGE QN: <0.1 KU(A)/L
D FARINAE IGE QN: <0.1 KU(A)/L
D PTERONYSS IGE QN: <0.1 KU(A)/L
DOG DANDER+EPITH IGE QN: <0.1 KU(A)/L
IGE SERPL-ACNC: 75 KU/L (ref 0–114)
MAPLE IGE QN: <0.1 KU(A)/L
MARSH ELDER IGE QN: <0.1 KU(A)/L
MOUSE URINE PROT IGE QN: <0.1 KU(A)/L
NETTLE IGE QN: <0.1 KU(A)/L
P NOTATUM IGE QN: <0.1 KU(A)/L
ROACH IGE QN: 0.11 KU(A)/L
SALTWORT IGE QN: <0.1 KU(A)/L
SILVER BIRCH IGE QN: <0.1 KU(A)/L
TIMOTHY IGE QN: <0.1 KU(A)/L
WHITE ASH IGE QN: <0.1 KU(A)/L
WHITE ELM IGE QN: <0.1 KU(A)/L
WHITE MULBERRY IGE QN: <0.1 KU(A)/L
WHITE OAK IGE QN: <0.1 KU(A)/L

## 2023-08-17 ENCOUNTER — LAB (OUTPATIENT)
Dept: LAB | Facility: CLINIC | Age: 28
End: 2023-08-17
Payer: COMMERCIAL

## 2023-08-17 LAB
HOLD SPECIMEN: NORMAL

## 2023-08-22 ENCOUNTER — VIRTUAL VISIT (OUTPATIENT)
Dept: FAMILY MEDICINE | Facility: CLINIC | Age: 28
End: 2023-08-22
Payer: COMMERCIAL

## 2023-08-22 DIAGNOSIS — Z78.9 ATTEMPTING TO CONCEIVE: ICD-10-CM

## 2023-08-22 DIAGNOSIS — E06.3 HASHIMOTO'S THYROIDITIS: Primary | ICD-10-CM

## 2023-08-22 PROCEDURE — 99213 OFFICE O/P EST LOW 20 MIN: CPT | Mod: VID | Performed by: FAMILY MEDICINE

## 2023-08-22 RX ORDER — LEVOTHYROXINE SODIUM 100 UG/1
100 TABLET ORAL DAILY
Qty: 90 TABLET | Refills: 3 | Status: SHIPPED | OUTPATIENT
Start: 2023-08-22 | End: 2024-05-06

## 2023-08-22 RX ORDER — PRENATAL VIT/IRON FUM/FOLIC AC 27MG-0.8MG
1 TABLET ORAL DAILY
Qty: 90 TABLET | Refills: 3 | Status: SHIPPED | OUTPATIENT
Start: 2023-08-22

## 2023-08-22 NOTE — PROGRESS NOTES
Tierra is a 28 year old who is being evaluated via a billable video visit.      How would you like to obtain your AVS? MyChart  If the video visit is dropped, the invitation should be resent by: Text to cell phone: [phone]  Will anyone else be joining your video visit? No          Assessment & Plan     Hashimoto's thyroiditis, compensated on Levothyroxine  -Refill:  levothyroxine (SYNTHROID/LEVOTHROID) 100 MCG tablet  Dispense: 90 tablet; Refill: 3    Attempting to conceive  - Refill: Prenatal Vit-Fe Fumarate-FA (PRENATAL MULTIVITAMIN W/IRON) 27-0.8 MG tablet  Dispense: 90 tablet; Refill: 3      Return in about 8 months (around 4/22/2024) for Annual Physical exam and as needed..      Marisa Garcia MD  [facility] CONSUELO Mayorga is a 28 year old, presenting for the following health issues:  Recheck Medication        8/22/2023     3:16 PM   Additional Questions   Roomed by Kalyani   Accompanied by n/a       History of Present Illness       Hypothyroidism:     Since last visit, patient describes the following symptoms::  Dry skin, Hair loss, Tremors and Weight gain    Weight gain::  Less than 5 lbs.    She eats 2-3 servings of fruits and vegetables daily.She consumes 1 sweetened beverage(s) daily.She exercises with enough effort to increase her heart rate 30 to 60 minutes per day.  She exercises with enough effort to increase her heart rate 7 days per week.   She is taking medications regularly.     Patient left the Video Platform but we were able to reach her by phone to complete the visit today.     Follow up on Hypothyroidism. Requesting med refills and review recent labs.   Planning to travel to [place] soon.   Still trying to conceive. Seeing an OBGYN  Requesting refills on her prenatal vitamins.       Hypothyroidism Follow-up  On Levothyroxine 100 mcg/day. Tolerating well, no side effects reported.   Since last visit, patient describes the following symptoms: Weight stable, no  hair loss, no skin changes, no constipation, no loose stools  Recent TSH-  Component      Latest Ref Rng 5/22/2023  10:48 AM   TSH      0.40 - 4.00 mU/L 1.92          Review of Systems   Constitutional, HEENT, cardiovascular, pulmonary, gi and gu systems are negative, except as otherwise noted.      Objective           Vitals:  No vitals were obtained today due to virtual visit.    Physical Exam   GENERAL: Healthy, alert and no distress  RESP: No audible wheeze, cough,   Unable to completer remainder of the  exam due to Telephone visit    DATA  Recent labs reviewed.   Noted outside thyroid labs completed a month ago were normal.  TSH on 7/26/2023- 0.88. See Epic for details.             Video-Visit Details    Type of service:  Video Visit   Video Start Time:  5:35 pm   Video End Time: 5: 50 pm    Originating Location (pt. Location): Home  Distant Location (provider location):  On-site  Platform used for Video Visit: Agustina[employer]

## 2024-02-13 RX ORDER — ALPRAZOLAM 0.5 MG
TABLET ORAL
COMMUNITY
Start: 2023-12-20 | End: 2024-02-16

## 2024-02-16 ENCOUNTER — OFFICE VISIT (OUTPATIENT)
Dept: FAMILY MEDICINE | Facility: CLINIC | Age: 29
End: 2024-02-16
Payer: COMMERCIAL

## 2024-02-16 VITALS
HEART RATE: 86 BPM | WEIGHT: 131.8 LBS | BODY MASS INDEX: 24.25 KG/M2 | OXYGEN SATURATION: 98 % | TEMPERATURE: 97.3 F | SYSTOLIC BLOOD PRESSURE: 90 MMHG | RESPIRATION RATE: 16 BRPM | HEIGHT: 62 IN | DIASTOLIC BLOOD PRESSURE: 60 MMHG

## 2024-02-16 DIAGNOSIS — Z00.00 ROUTINE GENERAL MEDICAL EXAMINATION AT A HEALTH CARE FACILITY: Primary | ICD-10-CM

## 2024-02-16 DIAGNOSIS — Z13.220 LIPID SCREENING: ICD-10-CM

## 2024-02-16 DIAGNOSIS — E55.9 VITAMIN D DEFICIENCY: ICD-10-CM

## 2024-02-16 DIAGNOSIS — Z91.09 ENVIRONMENTAL ALLERGIES: ICD-10-CM

## 2024-02-16 DIAGNOSIS — E06.3 HASHIMOTO'S THYROIDITIS: ICD-10-CM

## 2024-02-16 DIAGNOSIS — R05.9 COUGH, UNSPECIFIED TYPE: ICD-10-CM

## 2024-02-16 DIAGNOSIS — R09.A2 GLOBUS SENSATION: ICD-10-CM

## 2024-02-16 PROCEDURE — 99395 PREV VISIT EST AGE 18-39: CPT | Performed by: FAMILY MEDICINE

## 2024-02-16 PROCEDURE — 99214 OFFICE O/P EST MOD 30 MIN: CPT | Mod: 25 | Performed by: FAMILY MEDICINE

## 2024-02-16 RX ORDER — FLUTICASONE PROPIONATE 50 MCG
2 SPRAY, SUSPENSION (ML) NASAL DAILY
Qty: 16 G | Refills: 0 | Status: SHIPPED | OUTPATIENT
Start: 2024-02-16 | End: 2024-05-02

## 2024-02-16 SDOH — HEALTH STABILITY: PHYSICAL HEALTH: ON AVERAGE, HOW MANY DAYS PER WEEK DO YOU ENGAGE IN MODERATE TO STRENUOUS EXERCISE (LIKE A BRISK WALK)?: 3 DAYS

## 2024-02-16 ASSESSMENT — ANXIETY QUESTIONNAIRES
GAD7 TOTAL SCORE: 0
GAD7 TOTAL SCORE: 0
IF YOU CHECKED OFF ANY PROBLEMS ON THIS QUESTIONNAIRE, HOW DIFFICULT HAVE THESE PROBLEMS MADE IT FOR YOU TO DO YOUR WORK, TAKE CARE OF THINGS AT HOME, OR GET ALONG WITH OTHER PEOPLE: NOT DIFFICULT AT ALL
2. NOT BEING ABLE TO STOP OR CONTROL WORRYING: NOT AT ALL
5. BEING SO RESTLESS THAT IT IS HARD TO SIT STILL: NOT AT ALL
3. WORRYING TOO MUCH ABOUT DIFFERENT THINGS: NOT AT ALL
1. FEELING NERVOUS, ANXIOUS, OR ON EDGE: NOT AT ALL
8. IF YOU CHECKED OFF ANY PROBLEMS, HOW DIFFICULT HAVE THESE MADE IT FOR YOU TO DO YOUR WORK, TAKE CARE OF THINGS AT HOME, OR GET ALONG WITH OTHER PEOPLE?: NOT DIFFICULT AT ALL
7. FEELING AFRAID AS IF SOMETHING AWFUL MIGHT HAPPEN: NOT AT ALL
4. TROUBLE RELAXING: NOT AT ALL
7. FEELING AFRAID AS IF SOMETHING AWFUL MIGHT HAPPEN: NOT AT ALL
6. BECOMING EASILY ANNOYED OR IRRITABLE: NOT AT ALL
GAD7 TOTAL SCORE: 0

## 2024-02-16 ASSESSMENT — PATIENT HEALTH QUESTIONNAIRE - PHQ9
10. IF YOU CHECKED OFF ANY PROBLEMS, HOW DIFFICULT HAVE THESE PROBLEMS MADE IT FOR YOU TO DO YOUR WORK, TAKE CARE OF THINGS AT HOME, OR GET ALONG WITH OTHER PEOPLE: NOT DIFFICULT AT ALL
SUM OF ALL RESPONSES TO PHQ QUESTIONS 1-9: 0
SUM OF ALL RESPONSES TO PHQ QUESTIONS 1-9: 0

## 2024-02-16 ASSESSMENT — PAIN SCALES - GENERAL: PAINLEVEL: NO PAIN (0)

## 2024-02-16 ASSESSMENT — SOCIAL DETERMINANTS OF HEALTH (SDOH): HOW OFTEN DO YOU GET TOGETHER WITH FRIENDS OR RELATIVES?: ONCE A WEEK

## 2024-02-16 NOTE — PROGRESS NOTES
Preventive Care Visit  [facility] CONSUELO Garcia MD, Family Medicine  Feb 16, 2024    Assessment & Plan     Routine general medical examination at a health care facility  - CBC with platelets  - Comprehensive metabolic panel (BMP + Alb, Alk Phos, ALT, AST, Total. Bili, TP)    Lipid screening  - Lipid panel reflex to direct LDL Fasting    Globus sensation  -Trial of PPI for a month: omeprazole (PRILOSEC) 20 MG DR capsule  Dispense: 30 capsule; Refill: 0    Cough, unspecified type- possible differentials to include laryngopharyngeal reflux, asthma, postnasal drip  - Trial: fluticasone (FLONASE) 50 MCG/ACT nasal spray  Dispense: 16 g; Refill: 0  - General PFT Lab (Please always keep checked)  - Pulmonary Function Test  - Trial of PPI, Omeprazole  -Patient encouraged to follow-up if workup is negative and symptoms persist.     Hashimoto's thyroiditis, compensated on levothyroxine  -Continue Levothyroxine.  No refills needed at this time     Environmental allergies  - Adult Allergy/Asthma  Referral    Vitamin D deficiency  - Vitamin D Deficiency    Will notify her with results and next steps.    Counseling  Appropriate preventive services were discussed with this patient, including applicable screening as appropriate for fall prevention, nutrition, physical activity, Tobacco-use cessation, weight loss and cognition.  Checklist reviewing preventive services available has been given to the patient.  Reviewed patient's diet, addressing concerns and/or questions.   She is at risk for lack of exercise and has been provided with information to increase physical activity for the benefit of her well-being.     Patient has been advised of split billing requirements and indicates understanding: No      Return in about 1 month (around 3/16/2024), or if symptoms worsen or fail to improve.      Gisel Mayorga is a 28 year old, presenting for the following:  Physical        2/16/2024     1:19 PM    Additional Questions   Roomed by ARGENTINA Jean Baptiste         2/16/2024     1:19 PM   Patient Reported Additional Medications   Patient reports taking the following new medications None per patient        Health Care Directive  Patient does not have a Health Care Directive or Living Will: Discussed advance care planning with patient; information given to patient to review.    Healthy Habits:     Getting at least 3 servings of Calcium per day:  Yes    Bi-annual eye exam:  Yes    Dental care twice a year:  Yes    Sleep apnea or symptoms of sleep apnea:  None    Diet:  Gluten-free/reduced    Frequency of exercise:  4-5 days/week    Duration of exercise:  15-30 minutes    Taking medications regularly:  Yes    Barriers to taking medications:  None    Medication side effects:  None    Additional concerns today:  Yes    Patient reports an ongoing cough with a globus sensation for more than a year.   Admits to occasional reflux with coughing at night.   Expresses frustration the cough doesn't seem to go away.  Denies having any chest pain, shortness of breath or wheezing-concerned about possible asthma.  Would like to proceed with further testing.   Previous allergy testing was positive for gentleman cockroaches.  Agreeable to proceed with an allergist consult.     Recently seen in Urgent care a week ago with Viral URI symptoms.     HYPOTHYROIDISM - Patient has a longstanding history of chronic Hypothyroidism. Patient has been doing well, noting no tremor, insomnia, hair loss or changes in skin texture. Continues to take medications as directed, without adverse reactions or side effects. Last TSH   Lab Results   Component Value Date    TSH 1.92 05/22/2023       Seeing an OB/GYN OB/GYN at an outside facility for female infertility workup.   Recent labs completed 2 months ago on 12/14/2023-noted normal TSH labs.     HEALTH CARE MAINTENANCE: Due for annual labs.  Declines vaccinations today.        2/16/2024   General Health   How  would you rate your overall physical health? Good   Feel stress (tense, anxious, or unable to sleep) To some extent   (!) STRESS CONCERN      2/16/2024   Nutrition   Three or more servings of calcium each day? Yes   Diet: Vegetarian/vegan    Gluten-free/reduced   How many servings of fruit and vegetables per day? (!) 0-1   How many sweetened beverages each day? 0-1         2/16/2024   Exercise   Days per week of moderate/strenous exercise 3 days         2/16/2024   Social Factors   Frequency of gathering with friends or relatives Once a week   Worry food won't last until get money to buy more No   Food not last or not have enough money for food? No   Do you have housing?  Yes   Are you worried about losing your housing? No   Lack of transportation? No   Unable to get utilities (heat,electricity)? No         2/16/2024   Dental   Dentist two times every year? Yes         2/16/2024   TB Screening   Were you born outside of [place]?  No       Today's PHQ-9 Score:       2/16/2024     1:08 PM   PHQ-9 SCORE   PHQ-9 Total Score MyChart 0   PHQ-9 Total Score 0         2/16/2024   Substance Use   Alcohol more than 3/day or more than 7/wk No   Do you use any other substances recreationally? No     Social History     Tobacco Use    Smoking status: Never     Passive exposure: Never    Smokeless tobacco: Never   Vaping Use    Vaping Use: Never used   Substance Use Topics    Alcohol use: Never    Drug use: Never             2/16/2024   Breast Cancer Screening   Family history of breast, colon, or ovarian cancer? No / Unknown      Mammogram Screening - Patient under 40 years of age: Routine Mammogram Screening not recommended.         2/16/2024   STI Screening   New sexual partner(s) since last STI/HIV test? No     History of abnormal Pap smear: NO - age 30-65 PAP every 5 years with negative HPV co-testing recommended             2/16/2024   Contraception/Family Planning   Questions about contraception or family planning No       "  Reviewed and updated as needed this visit by Provider       Med Hx              Past Medical History:   Diagnosis Date    Hypothyroidism      Past Surgical History:   Procedure Laterality Date    NO HISTORY OF SURGERY       OB History    Para Term  AB Living   0 0 0 0 0 0   SAB IAB Ectopic Multiple Live Births   0 0 0 0 0         Review of Systems  Constitutional, HEENT, cardiovascular, pulmonary, gi and gu systems are negative, except as otherwise noted.     Objective    Exam  BP 90/60   Pulse 86   Temp 97.3  F (36.3  C) (Temporal)   Resp 16   Ht 1.575 m (5' 2\")   Wt 59.8 kg (131 lb 12.8 oz)   LMP 2024 (Approximate)   SpO2 98%   BMI 24.11 kg/m     Estimated body mass index is 24.11 kg/m  as calculated from the following:    Height as of this encounter: 1.575 m (5' 2\").    Weight as of this encounter: 59.8 kg (131 lb 12.8 oz).    Physical Exam  GENERAL: alert and no distress  EYES: Eyes grossly normal to inspection, PERRL and conjunctivae and sclerae normal  HENT: ear canals and TM's normal, bilateral nasal turbinate hypertrophy nose. Mouth without ulcers or lesions  NECK: no adenopathy, no asymmetry, masses, or scars  RESP: lungs clear to auscultation - no rales, rhonchi or wheezes  CV: regular rate and rhythm, normal S1 S2, no S3 or S4, no murmur, click or rub, no peripheral edema  ABDOMEN: soft, nontender, no hepatosplenomegaly, no masses and bowel sounds normal  MS: no gross musculoskeletal defects noted, no edema  PSYCH: mentation appears normal, affect normal/bright    DATA  Recent labs reviewed.      Signed Electronically by: Marisa Garcia MD    Answers submitted by the patient for this visit:  Patient Health Questionnaire (Submitted on 2024)  If you checked off any problems, how difficult have these problems made it for you to do your work, take care of things at home, or get along with other people?: Not difficult at all  PHQ9 TOTAL SCORE: 0  FRANCINE-7 (Submitted on " 2/16/2024)  FRANCINE 7 TOTAL SCORE: 0

## 2024-02-16 NOTE — PATIENT INSTRUCTIONS
Schedule Lung function test     Nehal Adam      Preventive Care Advice   This is general advice given by our system to help you stay healthy. However, your care team may have specific advice just for you. Please talk to your care team about your preventive care needs.  Nutrition  Eat 5 or more servings of fruits and vegetables each day.  Try wheat bread, brown rice and whole grain pasta (instead of white bread, rice, and pasta).  Get enough calcium and vitamin D. Check the label on foods and aim for 100% of the RDA (recommended daily allowance).  Lifestyle  Exercise at least 150 minutes each week  (30 minutes a day, 5 days a week).  Do muscle strengthening activities 2 days a week. These help control your weight and prevent disease.  No smoking.  Wear sunscreen to prevent skin cancer.  Have a dental exam and cleaning every 6 months.  Yearly exams  See your health care team every year to talk about:  Any changes in your health.  Any medicines your care team has prescribed.  Preventive care, family planning, and ways to prevent chronic diseases.  Shots (vaccines)   HPV shots (up to age 26), if you've never had them before.  Hepatitis B shots (up to age 59), if you've never had them before.  COVID-19 shot: Get this shot when it's due.  Flu shot: Get a flu shot every year.  Tetanus shot: Get a tetanus shot every 10 years.  Pneumococcal, hepatitis A, and RSV shots: Ask your care team if you need these based on your risk.  Shingles shot (for age 50 and up)  General health tests  Diabetes screening:  Starting at age 35, Get screened for diabetes at least every 3 years.  If you are younger than age 35, ask your care team if you should be screened for diabetes.  Cholesterol test: At age 39, start having a cholesterol test every 5 years, or more often if advised.  Bone density scan (DEXA): At age 50, ask your care team if you should have this scan for osteoporosis (brittle bones).  Hepatitis C: Get tested at  least once in your life.  STIs (sexually transmitted infections)  Before age 24: Ask your care team if you should be screened for STIs.  After age 24: Get screened for STIs if you're at risk. You are at risk for STIs (including HIV) if:  You are sexually active with more than one person.  You don't use condoms every time.  You or a partner was diagnosed with a sexually transmitted infection.  If you are at risk for HIV, ask about PrEP medicine to prevent HIV.  Get tested for HIV at least once in your life, whether you are at risk for HIV or not.  Cancer screening tests  Cervical cancer screening: If you have a cervix, begin getting regular cervical cancer screening tests starting at age 21.  Breast cancer scan (mammogram): If you've ever had breasts, begin having regular mammograms starting at age 40. This is a scan to check for breast cancer.  Colon cancer screening: It is important to start screening for colon cancer at age 45.  Have a colonoscopy test every 10 years (or more often if you're at risk) Or, ask your provider about stool tests like a FIT test every year or Cologuard test every 3 years.  To learn more about your testing options, visit:   https://www.[employer]/549728.pdf.  For help making a decision, visit:   https://bit.ly/fc68774.  Prostate cancer screening test: If you have a prostate, ask your care team if a prostate cancer screening test (PSA) at age 55 is right for you.  Lung cancer screening: If you are a current or former smoker ages 50 to 80, ask your care team if ongoing lung cancer screenings are right for you.  For informational purposes only. Not to replace the advice of your health care provider. Copyright   2023 [employer]. All rights reserved. Clinically reviewed by the [facility] Transitions Program. V I O 176081 - REV 01/24.    Learning About Stress  What is stress?     Stress is your body's response to a hard situation. Your body can have a physical,  emotional, or mental response. Stress is a fact of life for most people, and it affects everyone differently. What causes stress for you may not be stressful for someone else.  A lot of things can cause stress. You may feel stress when you go on a job interview, take a test, or run a race. This kind of short-term stress is normal and even useful. It can help you if you need to work hard or react quickly. For example, stress can help you finish an important job on time.  Long-term stress is caused by ongoing stressful situations or events. Examples of long-term stress include long-term health problems, ongoing problems at work, or conflicts in your family. Long-term stress can harm your health.  How does stress affect your health?  When you are stressed, your body responds as though you are in danger. It makes hormones that speed up your heart, make you breathe faster, and give you a burst of energy. This is called the fight-or-flight stress response. If the stress is over quickly, your body goes back to normal and no harm is done.  But if stress happens too often or lasts too long, it can have bad effects. Long-term stress can make you more likely to get sick, and it can make symptoms of some diseases worse. If you tense up when you are stressed, you may develop neck, shoulder, or low back pain. Stress is linked to high blood pressure and heart disease.  Stress also harms your emotional health. It can make you myers, tense, or depressed. Your relationships may suffer, and you may not do well at work or school.  What can you do to manage stress?  You can try these things to help manage stress:   Do something active. Exercise or activity can help reduce stress. Walking is a great way to get started. Even everyday activities such as housecleaning or yard work can help.  Try yoga or drew chi. These techniques combine exercise and meditation. You may need some training at first to learn them.  Do something you enjoy. For  "example, listen to music or go to a movie. Practice your hobby or do volunteer work.  Meditate. This can help you relax, because you are not worrying about what happened before or what may happen in the future.  Do guided imagery. Imagine yourself in any setting that helps you feel calm. You can use online videos, books, or a teacher to guide you.  Do breathing exercises. For example:  From a standing position, bend forward from the waist with your knees slightly bent. Let your arms dangle close to the floor.  Breathe in slowly and deeply as you return to a standing position. Roll up slowly and lift your head last.  Hold your breath for just a few seconds in the standing position.  Breathe out slowly and bend forward from the waist.  Let your feelings out. Talk, laugh, cry, and express anger when you need to. Talking with supportive friends or family, a counselor, or a janey leader about your feelings is a healthy way to relieve stress. Avoid discussing your feelings with people who make you feel worse.  Write. It may help to write about things that are bothering you. This helps you find out how much stress you feel and what is causing it. When you know this, you can find better ways to cope.  What can you do to prevent stress?  You might try some of these things to help prevent stress:  Manage your time. This helps you find time to do the things you want and need to do.  Get enough sleep. Your body recovers from the stresses of the day while you are sleeping.  Get support. Your family, friends, and community can make a difference in how you experience stress.  Limit your news feed. Avoid or limit time on social media or news that may make you feel stressed.  Do something active. Exercise or activity can help reduce stress. Walking is a great way to get started.  Where can you learn more?  Go to https://www.healthwise.net/patiented  Enter N032 in the search box to learn more about \"Learning About Stress.\"  Current " as of: February 26, 2023               Content Version: 13.8    0804-5149 [employer].   Care instructions adapted under license by your healthcare professional. If you have questions about a medical condition or this instruction, always ask your healthcare professional. [employer] disclaims any warranty or liability for your use of this information.

## 2024-02-28 ENCOUNTER — LAB (OUTPATIENT)
Dept: LAB | Facility: CLINIC | Age: 29
End: 2024-02-28
Payer: COMMERCIAL

## 2024-02-28 DIAGNOSIS — E55.9 VITAMIN D DEFICIENCY: ICD-10-CM

## 2024-02-28 DIAGNOSIS — Z00.00 ROUTINE GENERAL MEDICAL EXAMINATION AT A HEALTH CARE FACILITY: ICD-10-CM

## 2024-02-28 DIAGNOSIS — Z13.220 LIPID SCREENING: ICD-10-CM

## 2024-02-28 LAB
ALBUMIN SERPL BCG-MCNC: 4.3 G/DL (ref 3.5–5.2)
ALP SERPL-CCNC: 40 U/L (ref 40–150)
ALT SERPL W P-5'-P-CCNC: 15 U/L (ref 0–50)
ANION GAP SERPL CALCULATED.3IONS-SCNC: 9 MMOL/L (ref 7–15)
AST SERPL W P-5'-P-CCNC: 21 U/L (ref 0–45)
BILIRUB SERPL-MCNC: 0.4 MG/DL
BUN SERPL-MCNC: 6.6 MG/DL (ref 6–20)
CALCIUM SERPL-MCNC: 9.2 MG/DL (ref 8.6–10)
CHLORIDE SERPL-SCNC: 105 MMOL/L (ref 98–107)
CHOLEST SERPL-MCNC: 135 MG/DL
CREAT SERPL-MCNC: 0.68 MG/DL (ref 0.51–0.95)
DEPRECATED HCO3 PLAS-SCNC: 26 MMOL/L (ref 22–29)
EGFRCR SERPLBLD CKD-EPI 2021: >90 ML/MIN/1.73M2
ERYTHROCYTE [DISTWIDTH] IN BLOOD BY AUTOMATED COUNT: 11.9 % (ref 10–15)
FASTING STATUS PATIENT QL REPORTED: YES
GLUCOSE SERPL-MCNC: 80 MG/DL (ref 70–99)
HCT VFR BLD AUTO: 40 % (ref 35–47)
HDLC SERPL-MCNC: 45 MG/DL
HGB BLD-MCNC: 14 G/DL (ref 11.7–15.7)
LDLC SERPL CALC-MCNC: 82 MG/DL
MCH RBC QN AUTO: 34.4 PG (ref 26.5–33)
MCHC RBC AUTO-ENTMCNC: 35 G/DL (ref 31.5–36.5)
MCV RBC AUTO: 98 FL (ref 78–100)
NONHDLC SERPL-MCNC: 90 MG/DL
PLATELET # BLD AUTO: 343 10E3/UL (ref 150–450)
POTASSIUM SERPL-SCNC: 4.1 MMOL/L (ref 3.4–5.3)
PROT SERPL-MCNC: 7 G/DL (ref 6.4–8.3)
RBC # BLD AUTO: 4.07 10E6/UL (ref 3.8–5.2)
SODIUM SERPL-SCNC: 140 MMOL/L (ref 135–145)
TRIGL SERPL-MCNC: 38 MG/DL
VIT D+METAB SERPL-MCNC: 37 NG/ML (ref 20–50)
WBC # BLD AUTO: 4.3 10E3/UL (ref 4–11)

## 2024-02-28 PROCEDURE — 82306 VITAMIN D 25 HYDROXY: CPT

## 2024-02-28 PROCEDURE — 36415 COLL VENOUS BLD VENIPUNCTURE: CPT

## 2024-02-28 PROCEDURE — 80061 LIPID PANEL: CPT

## 2024-02-28 PROCEDURE — 85027 COMPLETE CBC AUTOMATED: CPT

## 2024-02-28 PROCEDURE — 80053 COMPREHEN METABOLIC PANEL: CPT

## 2024-03-05 ENCOUNTER — MYC REFILL (OUTPATIENT)
Dept: FAMILY MEDICINE | Facility: CLINIC | Age: 29
End: 2024-03-05
Payer: COMMERCIAL

## 2024-03-05 DIAGNOSIS — E06.3 HASHIMOTO'S THYROIDITIS: ICD-10-CM

## 2024-03-05 DIAGNOSIS — Z78.9 ATTEMPTING TO CONCEIVE: ICD-10-CM

## 2024-03-06 RX ORDER — LEVOTHYROXINE SODIUM 100 UG/1
100 TABLET ORAL DAILY
Qty: 90 TABLET | Refills: 3 | OUTPATIENT
Start: 2024-03-06

## 2024-03-06 RX ORDER — PRENATAL VIT/IRON FUM/FOLIC AC 27MG-0.8MG
1 TABLET ORAL DAILY
Qty: 90 TABLET | Refills: 3 | OUTPATIENT
Start: 2024-03-06

## 2024-03-18 NOTE — TELEPHONE ENCOUNTER
FUTURE VISIT INFORMATION      FUTURE VISIT INFORMATION:  Date: 4/10/2024  Time: 1:45pm   Location:  Allergy   REFERRAL INFORMATION:  Referring provider:  Marisa Garcia MD   Referring providers clinic:  internal   Reason for visit/diagnosis  Environmental allergies     RECORDS REQUESTED FROM:       Clinic name Comments Records Status Imaging Status    Progress Notes 2/16/2024 Environmental Allergies  In EPIC

## 2024-04-01 ENCOUNTER — OFFICE VISIT (OUTPATIENT)
Dept: NURSING | Facility: CLINIC | Age: 29
End: 2024-04-01
Attending: FAMILY MEDICINE
Payer: COMMERCIAL

## 2024-04-01 VITALS — WEIGHT: 133.8 LBS | BODY MASS INDEX: 24.47 KG/M2

## 2024-04-01 DIAGNOSIS — R05.9 COUGH, UNSPECIFIED TYPE: ICD-10-CM

## 2024-04-01 PROCEDURE — 94726 PLETHYSMOGRAPHY LUNG VOLUMES: CPT | Performed by: INTERNAL MEDICINE

## 2024-04-01 PROCEDURE — 94375 RESPIRATORY FLOW VOLUME LOOP: CPT | Performed by: INTERNAL MEDICINE

## 2024-04-01 PROCEDURE — 94729 DIFFUSING CAPACITY: CPT | Performed by: INTERNAL MEDICINE

## 2024-04-02 LAB
DLCOUNC-%PRED-PRE: 121 %
DLCOUNC-PRE: 25.77 ML/MIN/MMHG
DLCOUNC-PRED: 21.2 ML/MIN/MMHG
ERV-%PRED-PRE: 58 %
ERV-PRE: 0.7 L
ERV-PRED: 1.18 L
EXPTIME-PRE: 6.33 SEC
FEF2575-%PRED-PRE: 107 %
FEF2575-PRE: 3.49 L/SEC
FEF2575-PRED: 3.25 L/SEC
FEFMAX-%PRED-PRE: 72 %
FEFMAX-PRE: 4.91 L/SEC
FEFMAX-PRED: 6.78 L/SEC
FEV1-%PRED-PRE: 96 %
FEV1-PRE: 2.72 L
FEV1FEV6-PRE: 88 %
FEV1FEV6-PRED: 85 %
FEV1FVC-PRE: 88 %
FEV1FVC-PRED: 86 %
FEV1SVC-PRE: 87 %
FEV1SVC-PRED: 81 %
FIFMAX-PRE: 3.19 L/SEC
FRCPLETH-%PRED-PRE: 70 %
FRCPLETH-PRE: 1.81 L
FRCPLETH-PRED: 2.59 L
FVC-%PRED-PRE: 93 %
FVC-PRE: 3.1 L
FVC-PRED: 3.31 L
IC-%PRED-PRE: 103 %
IC-PRE: 2.44 L
IC-PRED: 2.37 L
RVPLETH-%PRED-PRE: 81 %
RVPLETH-PRE: 1.12 L
RVPLETH-PRED: 1.37 L
TLCPLETH-%PRED-PRE: 90 %
TLCPLETH-PRE: 4.25 L
TLCPLETH-PRED: 4.69 L
VA-%PRED-PRE: 91 %
VA-PRE: 4.2 L
VC-%PRED-PRE: 89 %
VC-PRE: 3.14 L
VC-PRED: 3.49 L

## 2024-04-10 ENCOUNTER — PRE VISIT (OUTPATIENT)
Dept: ALLERGY | Facility: CLINIC | Age: 29
End: 2024-04-10

## 2024-04-30 ENCOUNTER — MYC MEDICAL ADVICE (OUTPATIENT)
Dept: FAMILY MEDICINE | Facility: CLINIC | Age: 29
End: 2024-04-30
Payer: COMMERCIAL

## 2024-05-01 ENCOUNTER — TELEPHONE (OUTPATIENT)
Dept: FAMILY MEDICINE | Facility: CLINIC | Age: 29
End: 2024-05-01

## 2024-05-01 ENCOUNTER — VIRTUAL VISIT (OUTPATIENT)
Dept: FAMILY MEDICINE | Facility: CLINIC | Age: 29
End: 2024-05-01
Payer: COMMERCIAL

## 2024-05-01 DIAGNOSIS — E06.3 HASHIMOTO'S THYROIDITIS: Primary | ICD-10-CM

## 2024-05-01 DIAGNOSIS — O09.01 PREGNANCY WITH HISTORY OF INFERTILITY IN FIRST TRIMESTER: ICD-10-CM

## 2024-05-01 PROCEDURE — 99213 OFFICE O/P EST LOW 20 MIN: CPT | Mod: 95 | Performed by: FAMILY MEDICINE

## 2024-05-01 RX ORDER — LEVOTHYROXINE SODIUM 100 UG/1
100 TABLET ORAL DAILY
Qty: 90 TABLET | Refills: 3 | Status: CANCELLED | OUTPATIENT
Start: 2024-05-01

## 2024-05-01 NOTE — PROGRESS NOTES
Tierra is a 29 year old who is being evaluated via a billable video visit.    How would you like to obtain your AVS? MyChart  If the video visit is dropped, the invitation should be resent by: Text to cell phone: [phone]  Will anyone else be joining your video visit? No      Assessment & Plan     Tierra was seen today for recheck medication.    Diagnoses and all orders for this visit:    Hashimoto's thyroiditis, on Levothyroxine       -      Discussed hypothyroidism management in pregnancy. Patient was at goal pre-conception TSH. Will repeat TSH and adjust Levothyroxine accordingly.  -     TSH with free T4 reflex; Future    Pregnancy with history of infertility in first trimester         -     Continue Prenatal Vitamins and routine OB care with OBGYN at [facility].      Will notify her with results and refill meds then.      Return in about 1 month (around 6/1/2024).      Subjective   Tierra is a 29 year old, presenting for the following health issues:  Recheck Medication        5/1/2024     4:47 PM   Additional Questions   Roomed by Velia   Accompanied by Self     Video Start Time:  5:30 pm    History of Present Illness       Hypothyroidism:     Since last visit, patient describes the following symptoms::  Constipation and Fatigue      Patient with a known history of hypothyroidism due to hashimoto's thyroiditis - currently on Levothyroxine 100 mcg/day.   Last TSH done 12/14/2023 was 0.78.     She recently found out that she is pregnant. Very exciting news as she's been trying to conceive for > 1 year.   LMP 3/26/24  ABRAN 12/31/2024  Currently at 5 w1d. Following with an OBGYN at [facility] for female infertility management.   States that she's been experiencing some fatigue and constipation otherwise she's doing well. No vaginal bleeding.     Concerned about hypothyroidism management in pregnancy.          Review of Systems  Constitutional, HEENT, cardiovascular, pulmonary, gi and gu systems are  negative, except as otherwise noted.      Objective           Vitals:  No vitals were obtained today due to virtual visit.    Physical Exam   GENERAL: alert and no distress  EYES: Eyes grossly normal to inspection.  No discharge or erythema, or obvious scleral/conjunctival abnormalities.  RESP: No audible wheeze, cough, or visible cyanosis.    SKIN: Visible skin clear. No significant rash, abnormal pigmentation or lesions.  NEURO: Cranial nerves grossly intact.  Mentation and speech appropriate for age.  PSYCH: Appropriate affect, tone, and pace of words    DATA  Recent labs reviewed.       Video-Visit Details    Type of service:  Video Visit   Video End Time: 5:45 pm  Originating Location (pt. Location): Home  Distant Location (provider location):  On-site  Platform used for Video Visit: Jameel  Signed Electronically by: Marisa Garcia MD

## 2024-05-01 NOTE — TELEPHONE ENCOUNTER
Reason for Call:  Appointment Request    Patient requesting this type of appt:  LAB    Requested provider:  LAb    Reason patient unable to be scheduled: Needs to be scheduled by clinic    When does patient want to be seen/preferred time: 1-2 days    Comments: Lab order was placed by Dr. Garcia    Could we send this information to you in [facility] or would you prefer to receive a phone call?:   Patient would prefer a phone call   Okay to leave a detailed message?: Yes at Cell number on file:    Telephone Information:   Mobile [phone]       Call taken on 5/1/2024 at 5:53 PM by SHEILA BOYER

## 2024-05-01 NOTE — TELEPHONE ENCOUNTER
See 3D FUTURE VISION IIhart.     Called patient to assist in scheduling a medication check with Dr. Garcai.     Yippy message also sent to patient.     Luz Reed RN BSN  [facility]

## 2024-05-01 NOTE — TELEPHONE ENCOUNTER
Future Appointments 5/1/2024 - 10/28/2024        Date Visit Type Length Department Provider     5/1/2024  5:30 PM OFFICE VISIT 30 min [facility] Marisa Garcia MD    Location Instructions:     [facility] is located at [id number] [place] [address], one block east of Highway  and just north of the [employer]. Access the parking lot by turning [address] from [address] onto [address], then turning north on [address].                   Patient has scheduled her Virtual visit for Medication review.     Luz Reed RN BSN  [facility]

## 2024-05-03 ENCOUNTER — LAB (OUTPATIENT)
Dept: LAB | Facility: CLINIC | Age: 29
End: 2024-05-03
Payer: COMMERCIAL

## 2024-05-03 DIAGNOSIS — E06.3 HASHIMOTO'S THYROIDITIS: ICD-10-CM

## 2024-05-03 PROCEDURE — 36415 COLL VENOUS BLD VENIPUNCTURE: CPT

## 2024-05-03 PROCEDURE — 84443 ASSAY THYROID STIM HORMONE: CPT

## 2024-05-04 LAB — TSH SERPL DL<=0.005 MIU/L-ACNC: 1.01 UIU/ML (ref 0.3–4.2)

## 2024-05-06 DIAGNOSIS — E06.3 HASHIMOTO'S THYROIDITIS: ICD-10-CM

## 2024-05-06 RX ORDER — LEVOTHYROXINE SODIUM 100 UG/1
100 TABLET ORAL DAILY
Qty: 90 TABLET | Refills: 3 | Status: SHIPPED | OUTPATIENT
Start: 2024-05-06

## 2024-05-24 ENCOUNTER — E-VISIT (OUTPATIENT)
Dept: FAMILY MEDICINE | Facility: CLINIC | Age: 29
End: 2024-05-24
Payer: COMMERCIAL

## 2024-05-24 DIAGNOSIS — Z53.9 DIAGNOSIS NOT YET DEFINED: Primary | ICD-10-CM

## 2024-11-10 DIAGNOSIS — Z78.9 ATTEMPTING TO CONCEIVE: ICD-10-CM

## 2024-11-12 RX ORDER — PRENATAL VIT/IRON FUM/FOLIC AC 27MG-0.8MG
1 TABLET ORAL DAILY
Qty: 90 TABLET | Refills: 0 | Status: SHIPPED | OUTPATIENT
Start: 2024-11-12

## 2025-02-24 ENCOUNTER — OFFICE VISIT (OUTPATIENT)
Dept: FAMILY MEDICINE | Facility: CLINIC | Age: 30
End: 2025-02-24
Payer: COMMERCIAL

## 2025-02-24 VITALS
BODY MASS INDEX: 26.91 KG/M2 | RESPIRATION RATE: 18 BRPM | WEIGHT: 146.2 LBS | HEART RATE: 81 BPM | TEMPERATURE: 97.9 F | HEIGHT: 62 IN | OXYGEN SATURATION: 100 % | SYSTOLIC BLOOD PRESSURE: 102 MMHG | DIASTOLIC BLOOD PRESSURE: 64 MMHG

## 2025-02-24 DIAGNOSIS — E06.3 HASHIMOTO'S THYROIDITIS: ICD-10-CM

## 2025-02-24 DIAGNOSIS — Z00.00 ROUTINE GENERAL MEDICAL EXAMINATION AT A HEALTH CARE FACILITY: Primary | ICD-10-CM

## 2025-02-24 DIAGNOSIS — Z13.220 LIPID SCREENING: ICD-10-CM

## 2025-02-24 PROCEDURE — 99395 PREV VISIT EST AGE 18-39: CPT | Performed by: FAMILY MEDICINE

## 2025-02-24 RX ORDER — LEVOTHYROXINE SODIUM 100 UG/1
100 TABLET ORAL DAILY
Qty: 90 TABLET | Refills: 3 | Status: SHIPPED | OUTPATIENT
Start: 2025-02-24

## 2025-02-24 RX ORDER — PRENATAL VIT/IRON FUM/FOLIC AC 27MG-0.8MG
1 TABLET ORAL DAILY
Qty: 90 TABLET | Refills: 0 | Status: SHIPPED | OUTPATIENT
Start: 2025-02-24

## 2025-02-24 SDOH — HEALTH STABILITY: PHYSICAL HEALTH: ON AVERAGE, HOW MANY DAYS PER WEEK DO YOU ENGAGE IN MODERATE TO STRENUOUS EXERCISE (LIKE A BRISK WALK)?: 3 DAYS

## 2025-02-24 ASSESSMENT — PAIN SCALES - GENERAL: PAINLEVEL_OUTOF10: NO PAIN (0)

## 2025-02-24 ASSESSMENT — SOCIAL DETERMINANTS OF HEALTH (SDOH): HOW OFTEN DO YOU GET TOGETHER WITH FRIENDS OR RELATIVES?: ONCE A WEEK

## 2025-02-24 NOTE — PROGRESS NOTES
"Preventive Care Visit  [facility] CONSUELO Garcia MD, Family Medicine  Feb 24, 2025      Assessment & Plan     Tierra was seen today for physical.    Diagnoses and all orders for this visit:    Routine general medical examination at a health care facility  -     REVIEW OF HEALTH MAINTENANCE PROTOCOL ORDERS  -     PRIMARY CARE FOLLOW-UP SCHEDULING; Future  -     Comprehensive metabolic panel (BMP + Alb, Alk Phos, ALT, AST, Total. Bili, TP); Future  -     Prenatal Vit-Fe Fumarate-FA (PRENATAL MULTIVITAMIN W/IRON) 27-0.8 MG tablet; Take 1 tablet by mouth daily.  -     Vitamin D Deficiency; Future    Lipid screening  -     Lipid panel reflex to direct LDL Fasting; Future    Hashimoto's thyroiditis, compensated on Levothyroxine  -     Refill: Levothyroxine (SYNTHROID/LEVOTHROID) 100 MCG tablet; Take 1 tablet (100 mcg) by mouth daily.        Patient has been advised of split billing requirements and indicates understanding: Yes        BMI  Estimated body mass index is 26.36 kg/m  as calculated from the following:    Height as of this encounter: 1.586 m (5' 2.44\").    Weight as of this encounter: 66.3 kg (146 lb 3.2 oz).   Weight management plan: Discussed healthy diet and exercise guidelines    Counseling  Appropriate preventive services were addressed with this patient via screening, questionnaire, or discussion as appropriate for fall prevention, nutrition, physical activity, Tobacco-use cessation, social engagement, weight loss and cognition.  Checklist reviewing preventive services available has been given to the patient.  Reviewed patient's diet, addressing concerns and/or questions.   She is at risk for lack of exercise and has been provided with information to increase physical activity for the benefit of her well-being.       Return in about 1 year (around 2/24/2026) for Physical Exam and as needed throughout the year.      Subjective   Tierra is a 29 year old, presenting for the " following:  Physical        2/24/2025     2:06 PM   Additional Questions   Roomed by Maribell GARY MA   Accompanied by n/a        WILLIAM  Tierra is a pleasant 29 year old female patient of mine here for her/his Annual Physical and chronic disease management.     Had baby via C section 2 months ago. Breastfeeding.  Doing well overall. Enjoying motherhood. Had her 6 week postpartum visit with OB- see Epic for details.    HYPOTHYROIDISM - Patient has a longstanding history of chronic Hypothyroidism. Patient has been doing well, noting no tremor, insomnia, hair loss or changes in skin texture. Continues to take medications as directed, without adverse reactions or side effects. Last TSH   Lab Results   Component Value Date    TSH 1.01 05/03/2024     Patient recently had her TSH checked 12 days ago at an outside facility on 2/12/2025- normal.     HEALTH CARE MAINTENANCE: Declines vaccines.       Health Care Directive  Patient does not have a Health Care Directive: Discussed advance care planning with patient; information given to patient to review.      2/24/2025   General Health   How would you rate your overall physical health? Excellent   Feel stress (tense, anxious, or unable to sleep) Not at all         2/24/2025   Nutrition   Three or more servings of calcium each day? Yes   Diet: Regular (no restrictions)   How many servings of fruit and vegetables per day? (!) 0-1   How many sweetened beverages each day? 0-1         2/24/2025   Exercise   Days per week of moderate/strenous exercise 3 days         2/24/2025   Social Factors   Frequency of gathering with friends or relatives Once a week   Worry food won't last until get money to buy more No   Food not last or not have enough money for food? No   Do you have housing? (Housing is defined as stable permanent housing and does not include staying ouside in a car, in a tent, in an abandoned building, in an overnight shelter, or couch-surfing.) Yes   Are you worried about losing  your housing? No   Lack of transportation? No   Unable to get utilities (heat,electricity)? No         2025   Dental   Dentist two times every year? Yes         2024   TB Screening   Were you born outside of the [place]? No         Today's PHQ-2 Score:       2025     1:53 PM   PHQ-2 (  [employer])   Q1: Little interest or pleasure in doing things 0   Q2: Feeling down, depressed or hopeless 0   PHQ-2 Score 0    Q1: Little interest or pleasure in doing things Not at all   Q2: Feeling down, depressed or hopeless Not at all   PHQ-2 Score 0       Patient-reported           2025   Substance Use   Alcohol more than 3/day or more than 7/wk Not Applicable   Do you use any other substances recreationally? No     Social History     Tobacco Use    Smoking status: Never     Passive exposure: Never    Smokeless tobacco: Never   Vaping Use    Vaping status: Never Used   Substance Use Topics    Alcohol use: Never    Drug use: Never       Mammogram Screening - Patient under 40 years of age: Routine Mammogram Screening not recommended.         2025   STI Screening   New sexual partner(s) since last STI/HIV test? No     History of abnormal Pap smear: No - age 30-64 HPV with reflex Pap every 5 years recommended             2025   Contraception/Family Planning   Questions about contraception or family planning No       Reviewed and updated as needed this visit by Provider   Tobacco   Meds   Med Hx  Surg Hx  Fam Hx            Past Medical History:   Diagnosis Date    Hypothyroidism      Past Surgical History:   Procedure Laterality Date     SECTION      x 1     Lab work is in process  Labs reviewed in EPIC  BP Readings from Last 3 Encounters:   25 102/64   24 90/60   23 98/62    Wt Readings from Last 3 Encounters:   25 66.3 kg (146 lb 3.2 oz)   24 60.7 kg (133 lb 12.8 oz)   24 59.8 kg (131 lb 12.8 oz)                  Patient Active Problem List   Diagnosis     "Hashimoto's thyroiditis     Past Surgical History:   Procedure Laterality Date     SECTION      x 1       Social History     Tobacco Use    Smoking status: Never     Passive exposure: Never    Smokeless tobacco: Never   Substance Use Topics    Alcohol use: Never     Family History   Problem Relation Age of Onset    Hyperlipidemia Mother     Diabetes Maternal Uncle     Breast Cancer No family hx of     Colon Cancer No family hx of          Current Outpatient Medications   Medication Sig Dispense Refill    levothyroxine (SYNTHROID/LEVOTHROID) 100 MCG tablet Take 1 tablet (100 mcg) by mouth daily. 90 tablet 3    Prenatal Vit-Fe Fumarate-FA (PRENATAL MULTIVITAMIN W/IRON) 27-0.8 MG tablet Take 1 tablet by mouth daily. 90 tablet 0     Allergies   Allergen Reactions    Tajik Cockroach          Review of Systems  Constitutional, HEENT, cardiovascular, pulmonary, gi and gu systems are negative, except as otherwise noted.     Objective    Exam  /64   Pulse 81   Temp 97.9  F (36.6  C) (Temporal)   Resp 18   Ht 1.586 m (5' 2.44\")   Wt 66.3 kg (146 lb 3.2 oz)   LMP  (LMP Unknown)   SpO2 100%   Breastfeeding Yes   BMI 26.36 kg/m     Estimated body mass index is 26.36 kg/m  as calculated from the following:    Height as of this encounter: 1.586 m (5' 2.44\").    Weight as of this encounter: 66.3 kg (146 lb 3.2 oz).    Physical Exam  GENERAL: alert and no distress  EYES: Eyes grossly normal to inspection, PERRL and conjunctivae and sclerae normal  HENT: ear canals and TM's normal, nose and mouth without ulcers or lesions  NECK: no adenopathy, no asymmetry, masses, or scars  RESP: lungs clear to auscultation - no rales, rhonchi or wheezes  CV: regular rate and rhythm, normal S1 S2, no S3 or S4, no murmur, click or rub, no peripheral edema  ABDOMEN: soft, nontender, no hepatosplenomegaly, no masses and bowel sounds normal  MS: no gross musculoskeletal defects noted, no edema  SKIN: no suspicious lesions or " carlos  NEURO: Normal strength and tone, mentation intact and speech normal  PSYCH: mentation appears normal, affect normal/bright    DATA  Recent labs reviewed in Epic    Signed Electronically by: Marisa Garcia MD

## 2025-02-24 NOTE — PATIENT INSTRUCTIONS
Patient Education   Preventive Care Advice   This is general advice given by our system to help you stay healthy. However, your care team may have specific advice just for you. Please talk to your care team about your preventive care needs.  Nutrition  Eat 5 or more servings of fruits and vegetables each day.  Try wheat bread, brown rice and whole grain pasta (instead of white bread, rice, and pasta).  Get enough calcium and vitamin D. Check the label on foods and aim for 100% of the RDA (recommended daily allowance).  Lifestyle  Exercise at least 150 minutes each week  (30 minutes a day, 5 days a week).  Do muscle strengthening activities 2 days a week. These help control your weight and prevent disease.  No smoking.  Wear sunscreen to prevent skin cancer.  Have a dental exam and cleaning every 6 months.  Yearly exams  See your health care team every year to talk about:  Any changes in your health.  Any medicines your care team has prescribed.  Preventive care, family planning, and ways to prevent chronic diseases.  Shots (vaccines)   HPV shots (up to age 26), if you've never had them before.  Hepatitis B shots (up to age 59), if you've never had them before.  COVID-19 shot: Get this shot when it's due.  Flu shot: Get a flu shot every year.  Tetanus shot: Get a tetanus shot every 10 years.  Pneumococcal, hepatitis A, and RSV shots: Ask your care team if you need these based on your risk.  Shingles shot (for age 50 and up)  General health tests  Diabetes screening:  Starting at age 35, Get screened for diabetes at least every 3 years.  If you are younger than age 35, ask your care team if you should be screened for diabetes.  Cholesterol test: At age 39, start having a cholesterol test every 5 years, or more often if advised.  Bone density scan (DEXA): At age 50, ask your care team if you should have this scan for osteoporosis (brittle bones).  Hepatitis C: Get tested at least once in your life.  STIs (sexually  transmitted infections)  Before age 24: Ask your care team if you should be screened for STIs.  After age 24: Get screened for STIs if you're at risk. You are at risk for STIs (including HIV) if:  You are sexually active with more than one person.  You don't use condoms every time.  You or a partner was diagnosed with a sexually transmitted infection.  If you are at risk for HIV, ask about PrEP medicine to prevent HIV.  Get tested for HIV at least once in your life, whether you are at risk for HIV or not.  Cancer screening tests  Cervical cancer screening: If you have a cervix, begin getting regular cervical cancer screening tests starting at age 21.  Breast cancer scan (mammogram): If you've ever had breasts, begin having regular mammograms starting at age 40. This is a scan to check for breast cancer.  Colon cancer screening: It is important to start screening for colon cancer at age 45.  Have a colonoscopy test every 10 years (or more often if you're at risk) Or, ask your provider about stool tests like a FIT test every year or Cologuard test every 3 years.  To learn more about your testing options, visit:   .  For help making a decision, visit:   https://bit.ly/oc90661.  Prostate cancer screening test: If you have a prostate, ask your care team if a prostate cancer screening test (PSA) at age 55 is right for you.  Lung cancer screening: If you are a current or former smoker ages 50 to 80, ask your care team if ongoing lung cancer screenings are right for you.  For informational purposes only. Not to replace the advice of your health care provider. Copyright   2023 [employer]. All rights reserved. Clinically reviewed by the [facility] Transitions Program. [employer] [id number] - REV 01/24.

## 2025-03-19 ENCOUNTER — LAB (OUTPATIENT)
Dept: LAB | Facility: CLINIC | Age: 30
End: 2025-03-19
Payer: COMMERCIAL

## 2025-03-19 DIAGNOSIS — Z00.00 ROUTINE GENERAL MEDICAL EXAMINATION AT A HEALTH CARE FACILITY: ICD-10-CM

## 2025-03-19 DIAGNOSIS — Z13.220 LIPID SCREENING: ICD-10-CM

## 2025-03-19 PROCEDURE — 36415 COLL VENOUS BLD VENIPUNCTURE: CPT

## 2025-03-20 LAB
ALBUMIN SERPL BCG-MCNC: 4.4 G/DL (ref 3.5–5.2)
ALP SERPL-CCNC: 68 U/L (ref 40–150)
ALT SERPL W P-5'-P-CCNC: 36 U/L (ref 0–50)
ANION GAP SERPL CALCULATED.3IONS-SCNC: 11 MMOL/L (ref 7–15)
AST SERPL W P-5'-P-CCNC: 29 U/L (ref 0–45)
BILIRUB SERPL-MCNC: 0.2 MG/DL
BUN SERPL-MCNC: 8.7 MG/DL (ref 6–20)
CALCIUM SERPL-MCNC: NORMAL MG/DL
CHLORIDE SERPL-SCNC: 105 MMOL/L (ref 98–107)
CHOLEST SERPL-MCNC: 171 MG/DL
CREAT SERPL-MCNC: 0.69 MG/DL (ref 0.51–0.95)
EGFRCR SERPLBLD CKD-EPI 2021: >90 ML/MIN/1.73M2
FASTING STATUS PATIENT QL REPORTED: YES
FASTING STATUS PATIENT QL REPORTED: YES
GLUCOSE SERPL-MCNC: 78 MG/DL (ref 70–99)
HCO3 SERPL-SCNC: 25 MMOL/L (ref 22–29)
HDLC SERPL-MCNC: 60 MG/DL
LDLC SERPL CALC-MCNC: 100 MG/DL
NONHDLC SERPL-MCNC: 111 MG/DL
POTASSIUM SERPL-SCNC: 4.2 MMOL/L (ref 3.4–5.3)
PROT SERPL-MCNC: 7.2 G/DL (ref 6.4–8.3)
SODIUM SERPL-SCNC: 141 MMOL/L (ref 135–145)
TRIGL SERPL-MCNC: 53 MG/DL
VIT D+METAB SERPL-MCNC: 16 NG/ML (ref 20–50)

## 2025-03-23 DIAGNOSIS — E55.9 VITAMIN D DEFICIENCY: Primary | ICD-10-CM

## 2025-03-23 RX ORDER — ERGOCALCIFEROL 1.25 MG/1
50000 CAPSULE, LIQUID FILLED ORAL WEEKLY
Qty: 8 CAPSULE | Refills: 0 | Status: SHIPPED | OUTPATIENT
Start: 2025-03-23

## 2025-08-12 DIAGNOSIS — Z00.00 ROUTINE GENERAL MEDICAL EXAMINATION AT A HEALTH CARE FACILITY: ICD-10-CM

## 2025-08-12 RX ORDER — PRENATAL VIT/IRON FUM/FOLIC AC 27MG-0.8MG
1 TABLET ORAL DAILY
Qty: 90 TABLET | Refills: 0 | Status: SHIPPED | OUTPATIENT
Start: 2025-08-12